# Patient Record
Sex: FEMALE | Race: BLACK OR AFRICAN AMERICAN | Employment: FULL TIME | URBAN - METROPOLITAN AREA
[De-identification: names, ages, dates, MRNs, and addresses within clinical notes are randomized per-mention and may not be internally consistent; named-entity substitution may affect disease eponyms.]

---

## 2017-01-05 ENCOUNTER — TELEPHONE (OUTPATIENT)
Dept: FAMILY MEDICINE CLINIC | Age: 52
End: 2017-01-05

## 2017-03-16 ENCOUNTER — HOSPITAL ENCOUNTER (OUTPATIENT)
Dept: LAB | Age: 52
Discharge: HOME | End: 2017-03-16

## 2017-03-16 ENCOUNTER — HOSPITAL ENCOUNTER (OUTPATIENT)
Dept: GENERAL RADIOLOGY | Age: 52
Discharge: HOME | End: 2017-03-16
Payer: COMMERCIAL

## 2017-03-16 ENCOUNTER — OFFICE VISIT (OUTPATIENT)
Dept: FAMILY MEDICINE CLINIC | Age: 52
End: 2017-03-16

## 2017-03-16 VITALS
HEART RATE: 80 BPM | OXYGEN SATURATION: 100 % | SYSTOLIC BLOOD PRESSURE: 135 MMHG | TEMPERATURE: 97.7 F | WEIGHT: 219 LBS | BODY MASS INDEX: 40.3 KG/M2 | DIASTOLIC BLOOD PRESSURE: 95 MMHG | RESPIRATION RATE: 18 BRPM | HEIGHT: 62 IN

## 2017-03-16 DIAGNOSIS — M79.672 LEFT FOOT PAIN: ICD-10-CM

## 2017-03-16 DIAGNOSIS — D75.839 THROMBOCYTOSIS: ICD-10-CM

## 2017-03-16 DIAGNOSIS — E55.9 VITAMIN D DEFICIENCY: ICD-10-CM

## 2017-03-16 DIAGNOSIS — E78.00 PURE HYPERCHOLESTEROLEMIA: ICD-10-CM

## 2017-03-16 DIAGNOSIS — I10 ESSENTIAL HYPERTENSION: ICD-10-CM

## 2017-03-16 DIAGNOSIS — M79.672 LEFT FOOT PAIN: Primary | ICD-10-CM

## 2017-03-16 PROCEDURE — 73620 X-RAY EXAM OF FOOT: CPT

## 2017-03-16 RX ORDER — METOPROLOL SUCCINATE 25 MG/1
25 TABLET, EXTENDED RELEASE ORAL DAILY
Qty: 30 TAB | Refills: 3 | Status: SHIPPED | OUTPATIENT
Start: 2017-03-16 | End: 2017-10-20 | Stop reason: SDUPTHER

## 2017-03-16 RX ORDER — MELATONIN
1000 DAILY
Qty: 90 TAB | Refills: 1 | Status: SHIPPED | OUTPATIENT
Start: 2017-03-16 | End: 2017-10-20

## 2017-03-16 NOTE — MR AVS SNAPSHOT
Visit Information Date & Time Provider Department Dept. Phone Encounter #  
 3/16/2017 10:45 AM Desiree Zaragoza, [address] [facility] [phone] [id number] Follow-up Instructions Return in about 1 month (around 4/16/2017) for Dr LACKEY Upcoming Health Maintenance Date Due DTaP/Tdap/Td series (1 - Tdap) 2/9/1985 FOBT Q 1 YEAR AGE 50-75 2/9/2014 INFLUENZA AGE 9 TO ADULT 8/1/2016 BREAST CANCER SCRN MAMMOGRAM 3/31/2018 PAP AKA CERVICAL CYTOLOGY 2/10/2019 Allergies as of 3/16/2017  Review Complete On: 3/16/2017 By: Desiree Zaragoza MD  
  
 Severity Noted Reaction Type Reactions Shellfish Derived  01/12/2016    Hives Current Immunizations  Reviewed on 1/12/2016 Name Date Influenza Vaccine (Quad) 1/12/2016 11:10 AM  
  
 Not reviewed this visit You Were Diagnosed With   
  
 Codes Comments Left foot pain    -  Primary ICD-10-CM: V74.140 ICD-9-CM: 729.5 Vitamin D deficiency     ICD-10-CM: E55.9 ICD-9-CM: 268.9 Pure hypercholesterolemia     ICD-10-CM: E78.00 ICD-9-CM: 272.0 Thrombocytosis ([facility] [address]     ICD-10-CM: D47.3 ICD-9-CM: 238.71 Essential hypertension     ICD-10-CM: I10 
ICD-9-CM: 401.9 Vitals BP Pulse Temp Resp Height(growth percentile) Weight(growth percentile) (!) 135/95 (BP 1 Location: Right arm, BP Patient Position: At rest) 80 97.7 °F (36.5 °C) (Oral) 18 5' 2\" (1.575 m) 219 lb (99.3 kg) SpO2 BMI OB Status Smoking Status 100% 40.06 kg/m2 Menopause Never Smoker Vitals History BMI and BSA Data Body Mass Index Body Surface Area 40.06 kg/m 2 2.08 m 2 Preferred Pharmacy Pharmacy Name Phone [facility] [address] [place] [address] Your Updated Medication List  
  
   
This list is accurate as of: 3/16/17 11:50 AM.  Always use your most recent med list. amLODIPine 10 mg tablet Commonly known as:  [place] Take 1 Tab by mouth daily. butalbital-acetaminophen-caffeine -40 mg per tablet Commonly known as:  [employer] Take 1 Tab by mouth every four (4) hours as needed for Pain. Max Daily Amount: 6 Tabs. * cholecalciferol 1,000 unit tablet Commonly known as:  VITAMIN D3 Take 2 Tabs by mouth daily. * cholecalciferol 1,000 unit tablet Commonly known as:  VITAMIN D3 Take 1 Tab by mouth daily. metoprolol succinate 25 mg XL tablet Commonly known as:  TOPROL-XL Take 1 Tab by mouth daily. * Notice: This list has 2 medication(s) that are the same as other medications prescribed for you. Read the directions carefully, and ask your doctor or other care provider to review them with you. Prescriptions Sent to Pharmacy Refills  
 cholecalciferol (VITAMIN D3) 1,000 unit tablet 1 Sig: Take 1 Tab by mouth daily. Class: Normal  
 Pharmacy: [address] [place] [address] Ph #: [phone] Route: Oral  
 metoprolol succinate (TOPROL-XL) 25 mg XL tablet 3 Sig: Take 1 Tab by mouth daily. Class: Normal  
 Pharmacy: [address] [place] [address] Ph #: [phone] Route: Oral  
  
We Performed the Following REFERRAL TO PODIATRY [REF90 Custom] Comments:  
 Please evaluate patient for  Left foot pain Follow-up Instructions Return in about 1 month (around 4/16/2017) for Dr LACKEY To-Do List   
 03/16/2017 Lab:  CBC WITH AUTOMATED DIFF   
  
 03/16/2017 Lab:  LIPID PANEL   
  
 03/16/2017 Lab:  SED RATE (ESR)   
  
 03/16/2017 Lab:  URIC ACID   
  
 03/16/2017 Lab:  VITAMIN D, 1, 25 DIHYDROXY   
  
 03/16/2017 Imaging:  XR FOOT LT AP/LAT   
  
 04/03/2017 8:30 AM  
  Appointment with [facility] at [place] [address] ([phone]) OUTSIDE FILMS  - Any outside films related to the study being scheduled should be brought with you on the day of the exam.  If this cannot be done there may be a delay in the reading of the study. MEDICATIONS  - Patient must bring a complete list of all medications currently taking to include prescriptions, over-the-counter meds, herbals, vitamins & any dietary supplements  GENERAL INSTRUCTIONS  - On the day of your exam do not use any bath powder, deodorant or lotions on the armpit area. -Tenderness of breasts may cause an increase of discomfort during procedure. If you are experiencing breast tenderness on the day of your appointment and would like to reschedule, please call [phone]. Referral Information Referral ID Referred By Referred To  
  
 [id number] [facility], [address] [place] [address] Not Available Visits Status Start Date End Date 1 New Request 3/16/17 3/16/18 If your referral has a status of pending review or denied, additional information will be sent to support the outcome of this decision. Introducing [facility] [place] [facility]! [employer] introduces [employer] patient portal. Now you can access parts of your medical record, email your doctor's office, and request medication refills online. 1. In your internet browser, go to [email or web address] 2. Click on the First Time User? Click Here link in the Sign In box. You will see the New Member Sign Up page. 3. Enter your [employer]elens Access Code exactly as it appears below. You will not need to use this code after youve completed the sign-up process. If you do not sign up before the expiration date, you must request a new code. · [employer] Access Code: [email or web address] Expires: 6/14/2017 11:50 AM 
 
4. Enter the last four digits of your Social Security Number (xxxx) and Date of Birth (mm/dd/yyyy) as indicated and click Submit. You will be taken to the next sign-up page. 5. Create a Ulule ID. This will be your Ulule login ID and cannot be changed, so think of one that is secure and easy to remember. 6. Create a Ulule password. You can change your password at any time. 7. Enter your Password Reset Question and Answer. This can be used at a later time if you forget your password. 8. Enter your e-mail address. You will receive e-mail notification when new information is available in [address] 9. Click Sign Up. You can now view and download portions of your medical record. 10. Click the Download Summary menu link to download a portable copy of your medical information. If you have questions, please visit the Frequently Asked Questions section of the Ulule website. Remember, Ulule is NOT to be used for urgent needs. For medical emergencies, dial 911. Now available from your iPhone and Android! Please provide this summary of care documentation to your next provider. Your primary care clinician is listed as Rhonda Johnson. If you have any questions after today's visit, please call [phone].

## 2017-03-16 NOTE — PROGRESS NOTES
Belen Macias is a 47 y.o.  female and presents with     Chief Complaint   Patient presents with    Foot Problem     both    Hypertension    Cholesterol Problem       Pt has pain to the medial aspect of left foot. Pt gives h/o injry and fracture of her foot in the remote past.  Pt is taking BP meds as directed . She is following diet for high chol. Pt takes vitamin D but ran out. Pt does get occasional headaches. Past Medical History:   Diagnosis Date    Sickle cell trait ([address]      Past Surgical History:   Procedure Laterality Date    HX TUBAL LIGATION       Current Outpatient Prescriptions   Medication Sig    cholecalciferol (VITAMIN D3) 1,000 unit tablet Take 1 Tab by mouth daily.  metoprolol succinate (TOPROL-XL) 25 mg XL tablet Take 1 Tab by mouth daily.  amLODIPine (NORVASC) 10 mg tablet Take 1 Tab by mouth daily.  butalbital-acetaminophen-caffeine (FIORICET) -40 mg per tablet Take 1 Tab by mouth every four (4) hours as needed for Pain. Max Daily Amount: 6 Tabs.  cholecalciferol (VITAMIN D3) 1,000 unit tablet Take 2 Tabs by mouth daily. No current facility-administered medications for this visit. Health Maintenance   Topic Date Due    DTaP/Tdap/Td series (1 - Tdap) 02/09/1985    FOBT Q 1 YEAR AGE 50-75  02/09/2014    INFLUENZA AGE 9 TO ADULT  08/01/2016    BREAST CANCER SCRN MAMMOGRAM  03/31/2018    PAP AKA CERVICAL CYTOLOGY  02/10/2019    Hepatitis C Screening  Completed     Immunization History   Administered Date(s) Administered    Influenza Vaccine (Quad) 01/12/2016     No LMP recorded. Patient is not currently having periods (Reason: Menopause). Allergies and Intolerances:    Allergies   Allergen Reactions    Shellfish Derived Hives       Family History:   Family History   Problem Relation Age of Onset    Heart Disease Father 61    Breast Cancer Other     Diabetes Mother     Hypertension Mother     Hypertension Sister     Diabetes Sister  Cancer Paternal Grandfather        Social History:   She  reports that she has never smoked. She has never used smokeless tobacco.  She  reports that she drinks alcohol. Review of Systems:   General: negative for - chills, fatigue, fever, weight change  Psych: negative for - anxiety, depression, irritability or mood swings  ENT: negative for - headaches, hearing change, nasal congestion, oral lesions, sneezing or sore throat  Heme/ Lymph: negative for - bleeding problems, bruising, pallor or swollen lymph nodes  Endo: negative for - hot flashes, polydipsia/polyuria or temperature intolerance  Resp: negative for - cough, shortness of breath or wheezing  CV: negative for - chest pain, edema or palpitations  GI: negative for - abdominal pain, change in bowel habits, constipation, diarrhea or nausea/vomiting  : negative for - dysuria, hematuria, incontinence, pelvic pain or vulvar/vaginal symptoms  MSK: negative for - joint pain, joint swelling or muscle pain, pos for left foot pain  Neuro: negative for - confusion, pos for  headaches,  Derm: negative for - dry skin, hair changes, rash or skin lesion changes          Physical:   Vitals:   Vitals:    03/16/17 1120   BP: (!) 135/95   Pulse: 80   Resp: 18   Temp: 97.7 °F (36.5 °C)   TempSrc: Oral   SpO2: 100%   Weight: 219 lb (99.3 kg)   Height: 5' 2\" (1.575 m)           Exam:   HEENT- atraumatic,normocephalic, awake, oriented, well nourished  Neck - supple,no enlarged lymph nodes, no JVD, no thyromegaly  Chest- CTA, no rhonchi, no crackles  Heart- rrr, no murmurs / gallop/rub  Abdomen- soft,BS+,NT, no hepatosplenomegaly  Ext - no c/c/edema , left foot mild bony prominence over medial aspect  Neuro- no focal deficits. Power 5/5 all extremities  Skin - warm,dry, no obvious rashes.           Review of Data:   LABS:   Lab Results   Component Value Date/Time    WBC 3.6 01/12/2016 11:14 AM    HGB 12.8 01/12/2016 11:14 AM    HCT 41.3 01/12/2016 11:14 AM PLATELET 039 81/62/0080 11:14 AM     Lab Results   Component Value Date/Time    Sodium 140 01/12/2016 11:14 AM    Potassium 4.8 01/12/2016 11:14 AM    Chloride 99 01/12/2016 11:14 AM    CO2 27 01/12/2016 11:14 AM    Glucose 81 01/12/2016 11:14 AM    BUN 11 01/12/2016 11:14 AM    Creatinine 0.72 01/12/2016 11:14 AM     Lab Results   Component Value Date/Time    Cholesterol, total 214 01/12/2016 11:14 AM    HDL Cholesterol 63 01/12/2016 11:14 AM    LDL, calculated 138 01/12/2016 11:14 AM    Triglyceride 67 01/12/2016 11:14 AM     No results found for: GPT        Impression / Plan:        ICD-10-CM ICD-9-CM    1. Left foot pain M79.672 729.5 URIC ACID      XR FOOT LT AP/LAT      REFERRAL TO PODIATRY   2. Vitamin D deficiency E55.9 268.9 cholecalciferol (VITAMIN D3) 1,000 unit tablet      VITAMIN D, 1, 25 DIHYDROXY   3. Pure hypercholesterolemia E78.00 272.0 LIPID PANEL   4. Thrombocytosis (HCC) D47.3 238.71 CBC WITH AUTOMATED DIFF      SED RATE (ESR)   5. Essential hypertension I10 401.9 metoprolol succinate (TOPROL-XL) 25 mg XL tablet     Low salt diet        Explained to patient risk benefits of the medications. Advised patient to stop meds if having any side effects. Pt verbalized understanding of the instructions. I have discussed the diagnosis with the patient and the intended plan as seen in the above orders. The patient has received an after-visit summary and questions were answered concerning future plans. I have discussed medication side effects and warnings with the patient as well. I have reviewed the plan of care with the patient, accepted their input and they are in agreement with the treatment goals. Reviewed plan of care. Patient has provided input and agrees with goals.     Follow-up Disposition: Not on Rito Quiroz MD

## 2017-03-17 LAB
1,25(OH)2D3 SERPL-MCNC: 38.4 PG/ML (ref 19.9–79.3)
BASOPHILS # BLD AUTO: 0 X10E3/UL (ref 0–0.2)
BASOPHILS NFR BLD AUTO: 1 %
CHOLEST SERPL-MCNC: 184 MG/DL (ref 100–199)
EOSINOPHIL # BLD AUTO: 0.1 X10E3/UL (ref 0–0.4)
EOSINOPHIL NFR BLD AUTO: 3 %
ERYTHROCYTE [DISTWIDTH] IN BLOOD BY AUTOMATED COUNT: 13.9 % (ref 12.3–15.4)
ERYTHROCYTE [SEDIMENTATION RATE] IN BLOOD BY WESTERGREN METHOD: 10 MM/HR (ref 0–40)
HCT VFR BLD AUTO: 38.8 % (ref 34–46.6)
HDLC SERPL-MCNC: 51 MG/DL
HGB BLD-MCNC: 12.8 G/DL (ref 11.1–15.9)
IMM GRANULOCYTES # BLD: 0 X10E3/UL (ref 0–0.1)
IMM GRANULOCYTES NFR BLD: 0 %
INTERPRETATION, 910389: NORMAL
LDLC SERPL CALC-MCNC: 121 MG/DL (ref 0–99)
LYMPHOCYTES # BLD AUTO: 1.6 X10E3/UL (ref 0.7–3.1)
LYMPHOCYTES NFR BLD AUTO: 52 %
MCH RBC QN AUTO: 28.4 PG (ref 26.6–33)
MCHC RBC AUTO-ENTMCNC: 33 G/DL (ref 31.5–35.7)
MCV RBC AUTO: 86 FL (ref 79–97)
MONOCYTES # BLD AUTO: 0.2 X10E3/UL (ref 0.1–0.9)
MONOCYTES NFR BLD AUTO: 7 %
NEUTROPHILS # BLD AUTO: 1.1 X10E3/UL (ref 1.4–7)
NEUTROPHILS NFR BLD AUTO: 37 %
NRBC BLD AUTO-RTO: 0 %
PLATELET # BLD AUTO: 374 X10E3/UL (ref 150–379)
RBC # BLD AUTO: 4.51 X10E6/UL (ref 3.77–5.28)
TRIGL SERPL-MCNC: 62 MG/DL (ref 0–149)
URATE SERPL-MCNC: 4.3 MG/DL (ref 2.5–7.1)
VLDLC SERPL CALC-MCNC: 12 MG/DL (ref 5–40)
WBC # BLD AUTO: 3.1 X10E3/UL (ref 3.4–10.8)

## 2017-10-20 ENCOUNTER — OFFICE VISIT (OUTPATIENT)
Dept: FAMILY MEDICINE CLINIC | Age: 52
End: 2017-10-20

## 2017-10-20 ENCOUNTER — HOSPITAL ENCOUNTER (OUTPATIENT)
Dept: LAB | Age: 52
Discharge: HOME | End: 2017-10-20
Payer: COMMERCIAL

## 2017-10-20 ENCOUNTER — HOSPITAL ENCOUNTER (OUTPATIENT)
Dept: GENERAL RADIOLOGY | Age: 52
Discharge: HOME | End: 2017-10-20
Payer: COMMERCIAL

## 2017-10-20 VITALS
DIASTOLIC BLOOD PRESSURE: 81 MMHG | WEIGHT: 214 LBS | HEART RATE: 82 BPM | TEMPERATURE: 97.3 F | HEIGHT: 62 IN | OXYGEN SATURATION: 99 % | RESPIRATION RATE: 20 BRPM | BODY MASS INDEX: 39.38 KG/M2 | SYSTOLIC BLOOD PRESSURE: 139 MMHG

## 2017-10-20 DIAGNOSIS — G89.29 CHRONIC PAIN OF LEFT ANKLE: ICD-10-CM

## 2017-10-20 DIAGNOSIS — Z12.11 SCREENING FOR COLON CANCER: ICD-10-CM

## 2017-10-20 DIAGNOSIS — I10 ESSENTIAL HYPERTENSION: Chronic | ICD-10-CM

## 2017-10-20 DIAGNOSIS — M25.572 CHRONIC PAIN OF LEFT ANKLE: ICD-10-CM

## 2017-10-20 DIAGNOSIS — M25.552 PAIN OF LEFT HIP JOINT: ICD-10-CM

## 2017-10-20 DIAGNOSIS — Z12.39 SCREENING FOR BREAST CANCER: ICD-10-CM

## 2017-10-20 DIAGNOSIS — Z00.00 ROUTINE GENERAL MEDICAL EXAMINATION AT A HEALTH CARE FACILITY: Primary | ICD-10-CM

## 2017-10-20 LAB
ALBUMIN SERPL-MCNC: 3.5 G/DL (ref 3.4–5)
ALBUMIN/GLOB SERPL: 0.9 {RATIO} (ref 0.8–1.7)
ALP SERPL-CCNC: 100 U/L (ref 45–117)
ALT SERPL-CCNC: 17 U/L (ref 13–56)
ANION GAP SERPL CALC-SCNC: 8 MMOL/L (ref 3–18)
APPEARANCE UR: CLEAR
AST SERPL-CCNC: 15 U/L (ref 15–37)
BASOPHILS # BLD: 0 K/UL (ref 0–0.06)
BASOPHILS NFR BLD: 1 % (ref 0–2)
BILIRUB SERPL-MCNC: 0.7 MG/DL (ref 0.2–1)
BILIRUB UR QL: NEGATIVE
BUN SERPL-MCNC: 11 MG/DL (ref 7–18)
BUN/CREAT SERPL: 18 (ref 12–20)
CALCIUM SERPL-MCNC: 8.2 MG/DL (ref 8.5–10.1)
CHLORIDE SERPL-SCNC: 104 MMOL/L (ref 100–108)
CHOLEST SERPL-MCNC: 192 MG/DL
CO2 SERPL-SCNC: 29 MMOL/L (ref 21–32)
COLOR UR: YELLOW
CREAT SERPL-MCNC: 0.61 MG/DL (ref 0.6–1.3)
DIFFERENTIAL METHOD BLD: ABNORMAL
EOSINOPHIL # BLD: 0.1 K/UL (ref 0–0.4)
EOSINOPHIL NFR BLD: 2 % (ref 0–5)
ERYTHROCYTE [DISTWIDTH] IN BLOOD BY AUTOMATED COUNT: 12.9 % (ref 11.6–14.5)
EST. AVERAGE GLUCOSE BLD GHB EST-MCNC: 103 MG/DL
GLOBULIN SER CALC-MCNC: 3.8 G/DL (ref 2–4)
GLUCOSE SERPL-MCNC: 76 MG/DL (ref 74–99)
GLUCOSE UR STRIP.AUTO-MCNC: NEGATIVE MG/DL
HBA1C MFR BLD: 5.2 % (ref 4.2–5.6)
HCT VFR BLD AUTO: 36.6 % (ref 35–45)
HDLC SERPL-MCNC: 58 MG/DL (ref 40–60)
HDLC SERPL: 3.3 {RATIO} (ref 0–5)
HGB BLD-MCNC: 11.9 G/DL (ref 12–16)
HGB UR QL STRIP: NEGATIVE
KETONES UR QL STRIP.AUTO: NEGATIVE MG/DL
LDLC SERPL CALC-MCNC: 120.6 MG/DL (ref 0–100)
LEUKOCYTE ESTERASE UR QL STRIP.AUTO: NEGATIVE
LIPID PROFILE,FLP: ABNORMAL
LYMPHOCYTES # BLD: 2.6 K/UL (ref 0.9–3.6)
LYMPHOCYTES NFR BLD: 60 % (ref 21–52)
MCH RBC QN AUTO: 28.1 PG (ref 24–34)
MCHC RBC AUTO-ENTMCNC: 32.5 G/DL (ref 31–37)
MCV RBC AUTO: 86.5 FL (ref 74–97)
MONOCYTES # BLD: 0.3 K/UL (ref 0.05–1.2)
MONOCYTES NFR BLD: 7 % (ref 3–10)
NEUTS SEG # BLD: 1.3 K/UL (ref 1.8–8)
NEUTS SEG NFR BLD: 30 % (ref 40–73)
NITRITE UR QL STRIP.AUTO: NEGATIVE
PH UR STRIP: 5 [PH] (ref 5–8)
PLATELET # BLD AUTO: 431 K/UL (ref 135–420)
PMV BLD AUTO: 9.3 FL (ref 9.2–11.8)
POTASSIUM SERPL-SCNC: 4.3 MMOL/L (ref 3.5–5.5)
PROT SERPL-MCNC: 7.3 G/DL (ref 6.4–8.2)
PROT UR STRIP-MCNC: NEGATIVE MG/DL
RBC # BLD AUTO: 4.23 M/UL (ref 4.2–5.3)
SODIUM SERPL-SCNC: 141 MMOL/L (ref 136–145)
SP GR UR REFRACTOMETRY: 1.03 (ref 1–1.03)
TRIGL SERPL-MCNC: 67 MG/DL (ref ?–150)
UROBILINOGEN UR QL STRIP.AUTO: 0.2 EU/DL (ref 0.2–1)
VLDLC SERPL CALC-MCNC: 13.4 MG/DL
WBC # BLD AUTO: 4.2 K/UL (ref 4.6–13.2)

## 2017-10-20 PROCEDURE — 80053 COMPREHEN METABOLIC PANEL: CPT | Performed by: INTERNAL MEDICINE

## 2017-10-20 PROCEDURE — 73502 X-RAY EXAM HIP UNI 2-3 VIEWS: CPT

## 2017-10-20 PROCEDURE — 80061 LIPID PANEL: CPT | Performed by: INTERNAL MEDICINE

## 2017-10-20 PROCEDURE — 73610 X-RAY EXAM OF ANKLE: CPT

## 2017-10-20 PROCEDURE — 83036 HEMOGLOBIN GLYCOSYLATED A1C: CPT | Performed by: INTERNAL MEDICINE

## 2017-10-20 PROCEDURE — 36415 COLL VENOUS BLD VENIPUNCTURE: CPT | Performed by: INTERNAL MEDICINE

## 2017-10-20 PROCEDURE — 85025 COMPLETE CBC W/AUTO DIFF WBC: CPT | Performed by: INTERNAL MEDICINE

## 2017-10-20 PROCEDURE — 81003 URINALYSIS AUTO W/O SCOPE: CPT | Performed by: INTERNAL MEDICINE

## 2017-10-20 RX ORDER — IBUPROFEN 800 MG/1
800 TABLET ORAL
Qty: 100 TAB | Refills: 0 | Status: SHIPPED | OUTPATIENT
Start: 2017-10-20 | End: 2018-03-21 | Stop reason: SDUPTHER

## 2017-10-20 RX ORDER — METOPROLOL SUCCINATE 25 MG/1
25 TABLET, EXTENDED RELEASE ORAL DAILY
Qty: 90 TAB | Refills: 1 | Status: SHIPPED | OUTPATIENT
Start: 2017-10-20 | End: 2018-03-07 | Stop reason: SDUPTHER

## 2017-10-20 NOTE — PROGRESS NOTES
1. Have you been to the ER, urgent care clinic since your last visit? Hospitalized since your last visit? No    2. Have you seen or consulted any other health care providers outside of the [address] [place] [address] since your last visit? Include any pap smears or colon screening.  No

## 2017-10-20 NOTE — PROGRESS NOTES
ANNUAL PHYSICAL EXAMINATION    History of Present Illness  Yifan Browning is a 47 y.o. female who presents today for management of    Chief Complaint   Patient presents with    Physical    Hypertension       Patient complains of chronic left ankle pain. Pain occurs randomly even while at rest. Pain intensity 10/10. It is associated with on and off swelling. She has history of left ankle fracture 5 years ago. She also reports of left hip pain few months. Pain is intermittent, worse when walking. Pain is sharp, non-radiating. No history of trauma or injury. She takes Motrin 800mg and Tylenol as needed with partial relief. Health Maintenance  Colon cancer: due   Dyslipidemia: due  Diabetes mellitus: due  Influenza vaccine: done  Pneumococcal vaccine: not indicated  Tdap: done  Herpes Zoster vaccine: due at age 60  Hep B vaccine: not indicated    Weight:  Body mass index is Estimated body mass index is Body mass index is 39.14 kg/(m^2). Karo Briggs Discussed the patient's BMI with her.  The BMI follow up plan is as follows: Improve diet and 30 min of moderate activity at least 5 times a week. Cervical cancer:  Pap smear uptodate  Breast Cancer: Mammogram due   Diet: no  Exercise: no  Seatbelt: yes  Sunscreen: no  Dentist: yes      Past Medical History  Past Medical History:   Diagnosis Date    Sickle cell trait ([facility] [address]         Surgical History  Past Surgical History:   Procedure Laterality Date    HX TUBAL LIGATION          Current Medications  Current Outpatient Prescriptions   Medication Sig    metoprolol succinate (TOPROL-XL) 25 mg XL tablet Take 1 Tab by mouth daily.  ibuprofen (MOTRIN) 800 mg tablet Take 1 Tab by mouth every eight (8) hours as needed for Pain.  butalbital-acetaminophen-caffeine (FIORICET) -40 mg per tablet Take 1 Tab by mouth every four (4) hours as needed for Pain. Max Daily Amount: 6 Tabs.  cholecalciferol (VITAMIN D3) 1,000 unit tablet Take 2 Tabs by mouth daily.      No current facility-administered medications for this visit.         Allergies/Drug Reactions  Allergies   Allergen Reactions    Shellfish Derived Hives        Family History  Family History   Problem Relation Age of Onset    Heart Disease Father 61    Breast Cancer Other     Diabetes Mother     Hypertension Mother     Hypertension Sister     Diabetes Sister     Cancer Paternal Grandfather         Social History  Social History     Social History    Marital status: SINGLE     Spouse name: N/A    Number of children: N/A    Years of education: N/A     Occupational History          Stefan Radha Cheryl     Social History Main Topics    Smoking status: Never Smoker    Smokeless tobacco: Never Used    Alcohol use 0.0 oz/week     0 Standard drinks or equivalent per week      Comment: one glass of wine occasionally    Drug use: No    Sexual activity: No     Other Topics Concern    Not on file     Social History Narrative       Health Maintenance   Topic Date Due    DTaP/Tdap/Td series (1 - Tdap) 02/09/1985    FOBT Q 1 YEAR AGE 50-75  02/09/2014    BREAST CANCER SCRN MAMMOGRAM  03/31/2018    PAP AKA CERVICAL CYTOLOGY  02/10/2019    Hepatitis C Screening  Completed    INFLUENZA AGE 9 TO ADULT  Completed     Immunization History   Administered Date(s) Administered    Influenza Vaccine (Quad) 01/12/2016       Review of Systems  General ROS: negative for - chills  Psychological ROS: negative  Ophthalmic ROS: positive for - uses glasses  ENT ROS: negative  Allergy and Immunology ROS: negative  Hematological and Lymphatic ROS: negative  Endocrine ROS: negative  Breast ROS: negative for breast lumps  Respiratory ROS: no cough, shortness of breath, or wheezing  Cardiovascular ROS: no chest pain or dyspnea on exertion  Gastrointestinal ROS: no abdominal pain, change in bowel habits, or black or bloody stools  Genito-Urinary ROS: no dysuria, trouble voiding, or hematuria  Musculoskeletal ROS: positive for - pain in ankle - left and hip - left  Neurological ROS: negative  Dermatological ROS: negative      No exam data present      Physical Exam  Vital signs:   Vitals:    10/20/17 1338   BP: 139/81   Pulse: 82   Resp: 20   Temp: 97.3 °F (36.3 °C)   TempSrc: Oral   SpO2: 99%   Weight: 214 lb (97.1 kg)   Height: 5' 2\" (1.575 m)       General: alert, oriented, not in distress  Head: scalp normal, atraumatic  Eyes: pupils are equal and reactive, full and intact EOM's  Ears: patent ear canal, intact tympanic membrane  Nose: normal turbinates, no congestion or discharge  Lips/Mouth: moist lips and buccal mucosa, non-enlarged tonsils, pink throat  Neck: supple, no JVD, no lymphadenopathy, non-palpable thyroid  Chest/Lungs: clear breath sounds, no wheezing or crackles  Heart: normal rate, regular rhythm, no murmur  Abdomen: soft, non-distended, non-tender, normal bowel sounds, no organomegaly, no masses  Extremities: no focal deformities, no edema  Skin: no active skin lesions    Assessment/Plan:      1. Routine general medical examination at a health care facility    2. Essential hypertension  - CBC WITH AUTOMATED DIFF; Future  - HEMOGLOBIN A1C WITH EAG; Future  - LIPID PANEL; Future  - METABOLIC PANEL, COMPREHENSIVE; Future  - URINALYSIS W/ RFLX MICROSCOPIC; Future  - metoprolol succinate (TOPROL-XL) 25 mg XL tablet; Take 1 Tab by mouth daily. Dispense: 90 Tab; Refill: 1    3. Screening for colon cancer  [facility]    4. Screening for breast cancer  - [facility] MAMM BI SCREENING INCL CAD; Future    5. Pain of left hip joint  - XR HIP LT W OR WO PELV 2-3 VWS; Future  - ibuprofen (MOTRIN) 800 mg tablet; Take 1 Tab by mouth every eight (8) hours as needed for Pain. Dispense: 100 Tab; Refill: 0    6. Chronic pain of left ankle  - XR ANKLE LT MIN 3 V; Future  - ibuprofen (MOTRIN) 800 mg tablet; Take 1 Tab by mouth every eight (8) hours as needed for Pain. Dispense: 100 Tab;  Refill: 0      Follow-up Disposition:  Return in about 6 months (around 4/20/2018), or as needed, for rov. I have discussed the diagnosis with the patient and the intended plan as seen in the above orders. The patient has received an after-visit summary and questions were answered concerning future plans. I have discussed medication side effects and warnings with the patient as well. I have reviewed the plan of care with the patient, accepted their input and they are in agreement with the treatment goals.        Marleen Weinberg MD  October 20, 2017

## 2017-10-23 ENCOUNTER — TELEPHONE (OUTPATIENT)
Dept: FAMILY MEDICINE CLINIC | Age: 52
End: 2017-10-23

## 2017-10-23 NOTE — PROGRESS NOTES
Please inform patient that knee and hip xrays are normal. Hip pain likely from bursitis or muscular in origin. Continue NSAID. Activity as tolerated.

## 2018-02-02 ENCOUNTER — TELEPHONE (OUTPATIENT)
Dept: SURGERY | Age: 53
End: 2018-02-02

## 2018-03-07 DIAGNOSIS — I10 ESSENTIAL HYPERTENSION: Chronic | ICD-10-CM

## 2018-03-07 RX ORDER — METOPROLOL SUCCINATE 25 MG/1
25 TABLET, EXTENDED RELEASE ORAL DAILY
Qty: 90 TAB | Refills: 1 | Status: SHIPPED | OUTPATIENT
Start: 2018-03-07 | End: 2018-03-21 | Stop reason: SDUPTHER

## 2018-03-21 ENCOUNTER — TELEPHONE (OUTPATIENT)
Dept: FAMILY MEDICINE CLINIC | Age: 53
End: 2018-03-21

## 2018-03-21 ENCOUNTER — OFFICE VISIT (OUTPATIENT)
Dept: FAMILY MEDICINE CLINIC | Age: 53
End: 2018-03-21

## 2018-03-21 VITALS
HEART RATE: 73 BPM | WEIGHT: 218 LBS | OXYGEN SATURATION: 98 % | HEIGHT: 62 IN | SYSTOLIC BLOOD PRESSURE: 140 MMHG | BODY MASS INDEX: 40.12 KG/M2 | DIASTOLIC BLOOD PRESSURE: 90 MMHG | TEMPERATURE: 97.2 F | RESPIRATION RATE: 20 BRPM

## 2018-03-21 DIAGNOSIS — Z12.39 SCREENING FOR MALIGNANT NEOPLASM OF BREAST: Primary | ICD-10-CM

## 2018-03-21 DIAGNOSIS — G44.229 CHRONIC TENSION-TYPE HEADACHE, NOT INTRACTABLE: ICD-10-CM

## 2018-03-21 DIAGNOSIS — I10 ESSENTIAL HYPERTENSION: Primary | Chronic | ICD-10-CM

## 2018-03-21 DIAGNOSIS — I10 ESSENTIAL HYPERTENSION: Chronic | ICD-10-CM

## 2018-03-21 PROBLEM — E66.01 SEVERE OBESITY (BMI 35.0-39.9) WITH COMORBIDITY (HCC): Status: ACTIVE | Noted: 2018-03-21

## 2018-03-21 RX ORDER — METOPROLOL SUCCINATE 25 MG/1
25 TABLET, EXTENDED RELEASE ORAL DAILY
Qty: 90 TAB | Refills: 1 | Status: SHIPPED | OUTPATIENT
Start: 2018-03-21 | End: 2018-03-22 | Stop reason: CLARIF

## 2018-03-21 RX ORDER — IBUPROFEN 800 MG/1
800 TABLET ORAL
Qty: 100 TAB | Refills: 0 | Status: SHIPPED | OUTPATIENT
Start: 2018-03-21 | End: 2019-05-08 | Stop reason: SDUPTHER

## 2018-03-21 NOTE — MR AVS SNAPSHOT
[address] [place] [id number] [place] [id number] 
[phone] Patient: Yesenia Echavarria MRN: [id number] :1965 Visit Information Date & Time Provider Department Dept. Phone Encounter #  
 3/21/2018  9:30 AM Jose Mora MD [address] [place] [address]  [id number] Follow-up Instructions Return in about 6 months (around 2018) for rov. Upcoming Health Maintenance Date Due DTaP/Tdap/Td series (1 - Tdap) 1985 FOBT Q 1 YEAR AGE 50-75 2014 BREAST CANCER SCRN MAMMOGRAM 3/31/2018 PAP AKA CERVICAL CYTOLOGY 2/10/2019 Allergies as of 3/21/2018  Review Complete On: 3/21/2018 By: Jose Mora MD  
  
 Severity Noted Reaction Type Reactions Shellfish Derived  2016    Hives Current Immunizations  Reviewed on 2016 Name Date Influenza Vaccine (Quad) 2016 11:10 AM  
  
 Not reviewed this visit You Were Diagnosed With   
  
 Codes Comments Screening for malignant neoplasm of breast    -  Primary ICD-10-CM: Z12.31 
ICD-9-CM: V76.10 Essential hypertension     ICD-10-CM: I10 
ICD-9-CM: 401.9 Chronic tension-type headache, not intractable     ICD-10-CM: P73.237 ICD-9-CM: 339.12 Vitals BP Pulse Temp Resp Height(growth percentile) Weight(growth percentile) 140/90 73 97.2 °F (36.2 °C) (Oral) 20 5' 2\" (1.575 m) 218 lb (98.9 kg) SpO2 BMI OB Status Smoking Status 98% 39.87 kg/m2 Menopause Never Smoker Vitals History BMI and BSA Data Body Mass Index Body Surface Area  
 39.87 kg/m 2 2.08 m 2 Preferred Pharmacy Pharmacy Name Phone [facility] [phone] Your Updated Medication List  
  
   
This list is accurate as of 3/21/18 10:03 AM.  Always use your most recent med list.  
  
  
  
  
 cholecalciferol 1,000 unit tablet Commonly known as:  VITAMIN D3 Take 2 Tabs by mouth daily. ibuprofen 800 mg tablet Commonly known as:  MOTRIN Take 1 Tab by mouth every eight (8) hours as needed for Pain.  
  
 metoprolol succinate 25 mg XL tablet Commonly known as:  TOPROL-XL Take 1 Tab by mouth daily. Prescriptions Sent to Pharmacy Refills  
 metoprolol succinate (TOPROL-XL) 25 mg XL tablet 1 Sig: Take 1 Tab by mouth daily. Class: Normal  
 Pharmacy: [address] #: [phone] Route: Oral  
 ibuprofen (MOTRIN) 800 mg tablet 0 Sig: Take 1 Tab by mouth every eight (8) hours as needed for Pain. Class: Normal  
 Pharmacy: [address] #: [phone] Route: Oral  
  
Follow-up Instructions Return in about 6 months (around 9/21/2018) for rov. To-Do List   
 03/21/2018 Imaging:  [facility] MAMMO BI SCREENING INCL CAD Patient Instructions Domonique Preston MD 
[address] [place] [address] Phone: [phone] Fax: [phone] 
  
 
 
  
Introducing [facility] [place] [facility]! [facility] introduces [employer] patient portal. Now you can access parts of your medical record, email your doctor's office, and request medication refills online. 1. In your internet browser, go to [email or web address] 2. Click on the First Time User? Click Here link in the Sign In box. You will see the New Member Sign Up page. 3. Enter your [employer] Access Code exactly as it appears below. You will not need to use this code after youve completed the sign-up process. If you do not sign up before the expiration date, you must request a new code. · [employer] Access Code: [email or web address] Expires: 6/19/2018 10:03 AM 
 
4.  Enter the last four digits of your Social Security Number (xxxx) and Date of Birth (mm/dd/yyyy) as indicated and click Submit. You will be taken to the next sign-up page. 5. Create a DXY ID. This will be your DXY login ID and cannot be changed, so think of one that is secure and easy to remember. 6. Create a DXY password. You can change your password at any time. 7. Enter your Password Reset Question and Answer. This can be used at a later time if you forget your password. 8. Enter your e-mail address. You will receive e-mail notification when new information is available in [address] 9. Click Sign Up. You can now view and download portions of your medical record. 10. Click the Download Summary menu link to download a portable copy of your medical information. If you have questions, please visit the Frequently Asked Questions section of the DXY website. Remember, DXY is NOT to be used for urgent needs. For medical emergencies, dial 911. Now available from your iPhone and Android! Please provide this summary of care documentation to your next provider. Your primary care clinician is listed as Tatyana Howell. If you have any questions after today's visit, please call [phone].

## 2018-03-21 NOTE — PROGRESS NOTES
1. Have you been to the ER, urgent care clinic since your last visit? Hospitalized since your last visit? No    2. Have you seen or consulted any other health care providers outside of the [address] [place] [address] since your last visit? Include any pap smears or colon screening.  No

## 2018-03-21 NOTE — TELEPHONE ENCOUNTER
Pt stated that Dr. Sophie Lopes was suppose to prescribe her amlodipine instead of Metoprolol. Pt was just seen toay 3/21/18 and would for her medication to be switched. Asking to be called back.

## 2018-03-21 NOTE — PROGRESS NOTES
History of Present Illness  Enrique Beatty is a 46 y.o. female who presents today for management of    Chief Complaint   Patient presents with    Hypertension       Hypertension  Patient is here for follow-up of hypertension. She indicates that she is feeling well and denies any symptoms referable to her hypertension. She is not exercising and is not adherent to low salt diet. Blood pressure is not not measured at home. Use of agents associated with hypertension: none. Problem List  Patient Active Problem List    Diagnosis Date Noted    Severe obesity (BMI 35.0-39. 9) with comorbidity ([facility] 03/21/2018    Acute non intractable tension-type headache 09/23/2016    Vitamin D deficiency 01/29/2016    Hyperlipidemia 01/29/2016    Obesity 01/12/2016    Essential hypertension 01/12/2016    Joint stiffness 01/12/2016       Past Medical History  Past Medical History:   Diagnosis Date    Sickle cell trait ([facility]         Surgical History  Past Surgical History:   Procedure Laterality Date    HX TUBAL LIGATION          Current Medications  Current Outpatient Prescriptions   Medication Sig    ibuprofen (MOTRIN) 800 mg tablet Take 1 Tab by mouth every eight (8) hours as needed for Pain.  amLODIPine (NORVASC) 10 mg tablet Take 1 Tab by mouth daily.  cholecalciferol (VITAMIN D3) 1,000 unit tablet Take 2 Tabs by mouth daily. No current facility-administered medications for this visit.         Allergies/Drug Reactions  Allergies   Allergen Reactions    Shellfish Derived Hives        Family History  Family History   Problem Relation Age of Onset    Heart Disease Father 61    Breast Cancer Other     Diabetes Mother     Hypertension Mother     Hypertension Sister     Diabetes Sister     Cancer Paternal Grandfather         Social History  Social History     Social History    Marital status: SINGLE     Spouse name: N/A    Number of children: N/A    Years of education: N/A     Occupational History          [employer]     Social History Main Topics    Smoking status: Never Smoker    Smokeless tobacco: Never Used    Alcohol use 0.0 oz/week     0 Standard drinks or equivalent per week      Comment: one glass of wine occasionally    Drug use: No    Sexual activity: No     Other Topics Concern    Not on file     Social History Narrative       Review of Systems  General ROS: negative for - chills, fatigue or fever  Respiratory ROS: no cough, shortness of breath, or wheezing  Cardiovascular ROS: no chest pain or dyspnea on exertion  Gastrointestinal ROS: no abdominal pain, change in bowel habits, or black or bloody stools  Genito-Urinary ROS: no dysuria, trouble voiding, or hematuria  Musculoskeletal ROS: negative  Neurological ROS: negative      Physical Exam  Vital signs:   Vitals:    03/21/18 0938 03/21/18 0944   BP: (!) 143/105 140/90   Pulse: 73    Resp: 20    Temp: 97.2 °F (36.2 °C)    TempSrc: Oral    SpO2: 98%    Weight: 218 lb (98.9 kg)    Height: 5' 2\" (1.575 m)        General: alert, oriented, not in distress  Chest/Lungs: clear breath sounds, no wheezing or crackles  Heart: normal rate, regular rhythm, no murmur  Abdomen: soft, non-distended, non-tender, normal bowel sounds, no organomegaly, no masses  Extremities: no focal deformities, no edema    Laboratory/Tests:  Component      Latest Ref Rng & Units 10/20/2017 10/20/2017 10/20/2017 10/20/2017           3:10 PM  3:10 PM  3:10 PM  3:10 PM   Sodium      136 - 145 mmol/L  141     Potassium      3.5 - 5.5 mmol/L  4.3     Chloride      100 - 108 mmol/L  104     CO2      21 - 32 mmol/L  29     Anion gap      3.0 - 18 mmol/L  8     Glucose      74 - 99 mg/dL  76     BUN      7.0 - 18 MG/DL  11     Creatinine      0.6 - 1.3 MG/DL  0.61     BUN/Creatinine ratio      12 - 20    18     GFR est AA      >60 ml/min/1.73m2  >60     GFR est non-AA      >60 ml/min/1.73m2  >60     Calcium      8.5 - 10.1 MG/DL  8.2 (L)     Bilirubin, total 0.2 - 1.0 MG/DL  0.7     ALT (SGPT)      13 - 56 U/L  17     AST      15 - 37 U/L  15     Alk. phosphatase      45 - 117 U/L  100     Protein, total      6.4 - 8.2 g/dL  7.3     Albumin      3.4 - 5.0 g/dL  3.5     Globulin      2.0 - 4.0 g/dL  3.8     A-G Ratio      0.8 - 1.7    0.9     Color       YELLOW      Appearance       CLEAR      Specific gravity      1.005 - 1.030   1.026      pH (UA)      5.0 - 8.0   5.0      Protein      NEG mg/dL NEGATIVE      Glucose      NEG mg/dL NEGATIVE      Ketone      NEG mg/dL NEGATIVE      Bilirubin      NEG   NEGATIVE      Blood      NEG   NEGATIVE      Urobilinogen      0.2 - 1.0 EU/dL 0.2      Nitrites      NEG   NEGATIVE      Leukocyte Esterase      NEG   NEGATIVE      Cholesterol, total      <200 MG/DL   192    Triglyceride      <150 MG/DL   67    HDL Cholesterol      40 - 60 MG/DL   58    LDL, calculated      0 - 100 MG/DL   120.6 (H)    VLDL, calculated      MG/DL   13.4    CHOL/HDL Ratio      0 - 5.0     3.3    Hemoglobin A1c, (calculated)      4.2 - 5.6 %    5.2   Est. average glucose      mg/dL    103       Assessment/Plan:    1. Essential hypertension  - poorly controlled due to poor compliance with diet and medications  - restart amlodipine 10mg daily  - low sodium diet  - weight loss    2. Screening for malignant neoplasm of breast  - GURMEET MAMMO BI SCREENING INCL CAD; Future    3. Chronic tension-type headache, not intractable  - ibuprofen (MOTRIN) 800 mg tablet; Take 1 Tab by mouth every eight (8) hours as needed for Pain. Dispense: 100 Tab; Refill: 0        Follow-up Disposition:  Return in about 6 months (around 9/21/2018) for rov. I have discussed the diagnosis with the patient and the intended plan as seen in the above orders. The patient has received an after-visit summary and questions were answered concerning future plans. I have discussed medication side effects and warnings with the patient as well.  I have reviewed the plan of care with the patient, accepted their input and they are in agreement with the treatment goals.        Tino Sparks MD  March 22, 2018

## 2018-03-21 NOTE — PATIENT INSTRUCTIONS
Thuy Rodrigez MD  [id number] [facility] [address]  [address] [address]  Phone: [phone]  Fax: [phone]

## 2018-03-22 RX ORDER — AMLODIPINE BESYLATE 10 MG/1
10 TABLET ORAL DAILY
Qty: 90 TAB | Refills: 1 | Status: SHIPPED | OUTPATIENT
Start: 2018-03-22 | End: 2018-06-07 | Stop reason: SINTOL

## 2018-03-22 NOTE — TELEPHONE ENCOUNTER
Patient states that she has been taking amlodipine and not metoprolol. Amlodipine refilled and instructed patient not to take metoprolol.

## 2018-04-06 ENCOUNTER — HOSPITAL ENCOUNTER (OUTPATIENT)
Dept: MAMMOGRAPHY | Age: 53
Discharge: HOME | End: 2018-04-06
Attending: INTERNAL MEDICINE
Payer: COMMERCIAL

## 2018-04-06 DIAGNOSIS — Z12.39 SCREENING FOR MALIGNANT NEOPLASM OF BREAST: ICD-10-CM

## 2018-04-06 PROCEDURE — 77063 BREAST TOMOSYNTHESIS BI: CPT

## 2018-05-15 ENCOUNTER — TELEPHONE (OUTPATIENT)
Dept: FAMILY MEDICINE CLINIC | Age: 53
End: 2018-05-15

## 2018-05-15 NOTE — TELEPHONE ENCOUNTER
Patient called and stated she is experiencing chest bad on the left side under her breast. She stated it went away but came back as a sharp pain in the center of her chest. Advised pt to go to the ER or urgent care, upon discharge call office to schedule follow up appt. This encounter will be closed.

## 2018-06-07 ENCOUNTER — OFFICE VISIT (OUTPATIENT)
Dept: FAMILY MEDICINE CLINIC | Age: 53
End: 2018-06-07

## 2018-06-07 VITALS
RESPIRATION RATE: 16 BRPM | HEART RATE: 66 BPM | SYSTOLIC BLOOD PRESSURE: 136 MMHG | HEIGHT: 62 IN | TEMPERATURE: 96.8 F | OXYGEN SATURATION: 100 % | WEIGHT: 216.8 LBS | DIASTOLIC BLOOD PRESSURE: 77 MMHG | BODY MASS INDEX: 39.9 KG/M2

## 2018-06-07 DIAGNOSIS — M54.9 ACUTE BILATERAL BACK PAIN, UNSPECIFIED BACK LOCATION: Primary | ICD-10-CM

## 2018-06-07 DIAGNOSIS — I10 ESSENTIAL HYPERTENSION: Chronic | ICD-10-CM

## 2018-06-07 LAB
BILIRUB UR QL STRIP: NEGATIVE
GLUCOSE UR-MCNC: NEGATIVE MG/DL
KETONES P FAST UR STRIP-MCNC: NEGATIVE MG/DL
PH UR STRIP: 6.5 [PH] (ref 4.6–8)
PROT UR QL STRIP: NEGATIVE
SP GR UR STRIP: 1 (ref 1–1.03)
UA UROBILINOGEN AMB POC: NORMAL (ref 0.2–1)
URINALYSIS CLARITY POC: CLEAR
URINALYSIS COLOR POC: YELLOW
URINE BLOOD POC: NEGATIVE
URINE LEUKOCYTES POC: NEGATIVE
URINE NITRITES POC: NEGATIVE

## 2018-06-07 RX ORDER — LISINOPRIL 10 MG/1
10 TABLET ORAL DAILY
Qty: 90 TAB | Refills: 0 | Status: SHIPPED | OUTPATIENT
Start: 2018-06-07 | End: 2018-11-20 | Stop reason: SDUPTHER

## 2018-06-07 RX ORDER — ACETAMINOPHEN AND CODEINE PHOSPHATE 300; 30 MG/1; MG/1
1 TABLET ORAL
Qty: 20 TAB | Refills: 0 | Status: SHIPPED | OUTPATIENT
Start: 2018-06-07 | End: 2019-05-08

## 2018-06-07 NOTE — PROGRESS NOTES
Chief Complaint   Patient presents with    Back Pain     pt states it feels as if it's near her kidney     1. Have you been to the ER, urgent care clinic since your last visit? Hospitalized since your last visit? No    2. Have you seen or consulted any other health care providers outside of the [address] [place] [address] since your last visit? Include any pap smears or colon screening.  No

## 2018-06-07 NOTE — PROGRESS NOTES
History of Present Illness  Denise Waters is a 46 y.o. female who presents today for management of    Chief Complaint   Patient presents with    Back Pain     pt states it feels as if it's near her kidney       Back Pain  Patient presents for presents evaluation of left low back problems. Symptoms have been present for 2 weeks and include pain in leftlower back (cramping, squeezing in character; 10/10 in severity). Initial inciting event: none. Alleviating factors identifiable by patient are medication ibuprofen 800mg. Exacerbating factors identifiable by patient are standing, sitting, walking, bending forwards. Treatments so far initiated by patient: ibuprofen. Previous lower back problems: none. Previous workup: none. Previous treatments: none. Problem List  Patient Active Problem List    Diagnosis Date Noted    Severe obesity (BMI 35.0-39. 9) with comorbidity ([facility] 03/21/2018    Acute non intractable tension-type headache 09/23/2016    Vitamin D deficiency 01/29/2016    Hyperlipidemia 01/29/2016    Obesity 01/12/2016    Essential hypertension 01/12/2016    Joint stiffness 01/12/2016       Past Medical History  Past Medical History:   Diagnosis Date    Sickle cell trait ([facility]         Surgical History  Past Surgical History:   Procedure Laterality Date    HX TUBAL LIGATION          Current Medications  Current Outpatient Prescriptions   Medication Sig    acetaminophen-codeine (TYLENOL-CODEINE #3) 300-30 mg per tablet Take 1 Tab by mouth every six (6) hours as needed for Pain. Max Daily Amount: 4 Tabs.  lisinopril (PRINIVIL, ZESTRIL) 10 mg tablet Take 1 Tab by mouth daily.  ibuprofen (MOTRIN) 800 mg tablet Take 1 Tab by mouth every eight (8) hours as needed for Pain.  cholecalciferol (VITAMIN D3) 1,000 unit tablet Take 2 Tabs by mouth daily. No current facility-administered medications for this visit.         Allergies/Drug Reactions  Allergies   Allergen Reactions    Shellfish Derived Hives        Family History  Family History   Problem Relation Age of Onset    Heart Disease Father 61    Breast Cancer Other     Diabetes Mother     Hypertension Mother     Hypertension Sister     Diabetes Sister     Cancer Paternal Grandfather         Social History  Social History     Social History    Marital status: SINGLE     Spouse name: N/A    Number of children: N/A    Years of education: N/A     Occupational History          [employer]     Social History Main Topics    Smoking status: Never Smoker    Smokeless tobacco: Never Used    Alcohol use 0.0 oz/week     0 Standard drinks or equivalent per week      Comment: one glass of wine occasionally    Drug use: No    Sexual activity: No     Other Topics Concern    Not on file     Social History Narrative       Review of Systems  General ROS: negative for - chills, fatigue or fever  Respiratory ROS: no cough, shortness of breath, or wheezing  Cardiovascular ROS: no chest pain or dyspnea on exertion  Gastrointestinal ROS: no abdominal pain, change in bowel habits, or black or bloody stools  Genito-Urinary ROS: no dysuria, trouble voiding, or hematuria  positive for - urinary frequency/urgency  Musculoskeletal ROS: positive for - pain in back - lower  Neurological ROS: negative      Physical Exam  Vital signs:   Vitals:    06/07/18 1557   BP: 136/77   Pulse: 66   Resp: 16   Temp: 96.8 °F (36 °C)   TempSrc: Oral   SpO2: 100%   Weight: 216 lb 12.8 oz (98.3 kg)   Height: 5' 2\" (1.575 m)       General: alert, oriented, not in distress  Chest/Lungs: clear breath sounds, no wheezing or crackles  Heart: normal rate, regular rhythm, no murmur  Abdomen: soft, non-distended, non-tender, normal bowel sounds, no organomegaly, no masses  Extremities: no focal deformities, nonpitting bipedal edema  Cervical: Neck is midline. Normal muscle tone. No focal atrophy is noted. ROM pain free. Thoracic: No rash, ecchymosis, or gross obliquity.  No fasciculations. No focal atrophy is noted. No tenderness to palpation. Lumbar: No rash, ecchymosis, or gross obliquity. No fasciculations. No focal atrophy is noted. No pain with hip ROM. Full range of motion. No tenderness to palpation. SI joints non-tender. Trochanters non tender. Straight leg raising negative. Musculoskeletal: strength 5/5 on both upper and lower extremities. Sensation in the bilateral arms grossly intact to light touch. Sensation in the bilateral legs grossly intact to light touch. Laboratory/Tests:  Results for orders placed or performed in visit on 06/07/18   AMB POC URINALYSIS DIP STICK AUTO W/O MICRO     Status: Normal   Result Value Ref Range Status    Color (UA POC) Yellow  Final    Clarity (UA POC) Clear  Final    Glucose (UA POC) Negative Negative Final    Bilirubin (UA POC) Negative Negative Final    Ketones (UA POC) Negative Negative Final    Specific gravity (UA POC) 1.005 1.001 - 1.035 Final    Blood (UA POC) Negative Negative Final     Comment: q    pH (UA POC) 6.5 4.6 - 8.0 Final    Protein (UA POC) Negative Negative Final    Urobilinogen (UA POC) 0.2 mg/dL 0.2 - 1 Final    Nitrites (UA POC) Negative Negative Final    Leukocyte esterase (UA POC) Negative Negative Final       Assessment/Plan:      1. Acute bilateral back pain, unspecified back location, sacroiliac joint pain  - activity as tolerated  - home exercises  - ibuprofen as needed  - AMB POC URINALYSIS DIP STICK AUTO W/O MICRO  - acetaminophen-codeine (TYLENOL-CODEINE #3) 300-30 mg per tablet; Take 1 Tab by mouth every six (6) hours as needed for Pain. Max Daily Amount: 4 Tabs. Dispense: 20 Tab; Refill: 0    2. Essential hypertension  - stop amlodipine due to leg edema  - start lisinopril (PRINIVIL, ZESTRIL) 10 mg tablet; Take 1 Tab by mouth daily. Dispense: 90 Tab;  Refill: 0  - low salt diet    Follow-up Disposition:  Return in about 4 weeks (around 7/5/2018), or if symptoms worsen or fail to improve, for htn, back pain. I have discussed the diagnosis with the patient and the intended plan as seen in the above orders. The patient has received an after-visit summary and questions were answered concerning future plans. I have discussed medication side effects and warnings with the patient as well. I have reviewed the plan of care with the patient, accepted their input and they are in agreement with the treatment goals.        Kuldeep Rivera MD  June 7, 2018

## 2018-06-07 NOTE — PATIENT INSTRUCTIONS
Sacroiliac Pain: Exercises  Your Care Instructions  Here are some examples of typical rehabilitation exercises for your condition. Start each exercise slowly. Ease off the exercise if you start to have pain. Your doctor or physical therapist will tell you when you can start these exercises and which ones will work best for you. How to do the exercises  Knee-to-chest stretch    1. Do not do the knee-to-chest exercise if it causes or increases back or leg pain. 2. Lie on your back with your knees bent and your feet flat on the floor. You can put a small pillow under your head and neck if it is more comfortable. 3. Grasp your hands under one knee and bring the knee to your chest, keeping the other foot flat on the floor. 4. Keep your lower back pressed to the floor. Hold for at least 15 to 30 seconds. 5. Relax and lower the knee to the starting position. Repeat with the other leg. 6. Repeat 2 to 4 times with each leg. 7. To get more stretch, keep your other leg flat on the floor while pulling your knee to your chest.  Bridging    1. Lie on your back with both knees bent. Your knees should be bent about 90 degrees. 2. Tighten your belly muscles by pulling in your belly button toward your spine. Then push your feet into the floor, squeeze your buttocks, and lift your hips off the floor until your shoulders, hips, and knees are all in a straight line. 3. Hold for about 6 seconds as you continue to breathe normally, and then slowly lower your hips back down to the floor and rest for up to 10 seconds. 4. Repeat 8 to 12 times. Hip extension    1. Get down on your hands and knees on the floor. 2. Keeping your back and neck straight, lift one leg straight out behind you. When you lift your leg, keep your hips level. Don't let your back twist, and don't let your hip drop toward the floor. 3. Hold for 6 seconds. Repeat 8 to 12 times with each leg.   4. If you feel steady and strong when you do this exercise, you can make it more difficult. To do this, when you lift your leg, also lift the opposite arm straight out in front of you. For example, lift the left leg and the right arm at the same time. (This is sometimes called the \"bird dog exercise. \") Hold for 6 seconds, and repeat 8 to 12 times on each side. Clamshell    1. Lie on your side with a pillow under your head. Keep your feet and knees together and your knees bent. 2. Raise your top knee, but keep your feet together. Do not let your hips roll back. Your legs should open up like a clamshell. 3. Hold for 6 seconds. 4. Slowly lower your knee back down. Rest for 10 seconds. 5. Repeat 8 to 12 times. 6. Switch to your other side and repeat steps 1 through 5. Hamstring wall stretch    1. Lie on your back in a doorway, with one leg through the open door. 2. Slide your affected leg up the wall to straighten your knee. You should feel a gentle stretch down the back of your leg. 1. Do not arch your back. 2. Do not bend either knee. 3. Keep one heel touching the floor and the other heel touching the wall. Do not point your toes. 3. Hold the stretch for at least 1 minute to begin. Then try to lengthen the time you hold the stretch to as long as 6 minutes. 4. Switch legs, and repeat steps 1 through 3.  5. Repeat 2 to 4 times. 6. If you do not have a place to do this exercise in a doorway, there is another way to do it:  7. Lie on your back, and bend one knee. 8. Loop a towel under the ball and toes of that foot, and hold the ends of the towel in your hands. 9. Straighten your knee, and slowly pull back on the towel. You should feel a gentle stretch down the back of your leg. 10. Switch legs, and repeat steps 1 through 3.  11. Repeat 2 to 4 times. Lower abdominal strengthening    1. Lie on your back with your knees bent and your feet flat on the floor. 2. Tighten your belly muscles by pulling your belly button in toward your spine.   3. Lift one foot off the floor and bring your knee toward your chest, so that your knee is straight above your hip and your leg is bent like the letter \"L. \"  4. Lift the other knee up to the same position. 5. Lower one leg at a time to the starting position. 6. Keep alternating legs until you have lifted each leg 8 to 12 times. 7. Be sure to keep your belly muscles tight and your back still as you are moving your legs. Be sure to breathe normally. Piriformis stretch    1. Lie on your back with your legs straight. 2. Lift your affected leg, and bend your knee. With your opposite hand, reach across your body, and then gently pull your knee toward your opposite shoulder. 3. Hold the stretch for 15 to 30 seconds. 4. Switch legs and repeat steps 1 through 3.  5. Repeat 2 to 4 times. Follow-up care is a key part of your treatment and safety. Be sure to make and go to all appointments, and call your doctor if you are having problems. It's also a good idea to know your test results and keep a list of the medicines you take. Where can you learn more? Go to http://oli-anne.info/. Enter L873 in the search box to learn more about \"Sacroiliac Pain: Exercises. \"  Current as of: March 21, 2017  Content Version: 11.4  © 1253-7138 [employer]. Care instructions adapted under license by [employer] (which disclaims liability or warranty for this information). If you have questions about a medical condition or this instruction, always ask your healthcare professional. [address] any warranty or liability for your use of this information.

## 2018-06-07 NOTE — MR AVS SNAPSHOT
[address] [address] [address] [address] 
[phone] Patient: Olegario Mary MRN: [id number] :1965 Visit Information Date & Time Provider Department Dept. Phone Encounter #  
 2018  3:45 PM Oscar Phani Cárdenas 6 [phone] [id number] Follow-up Instructions Return in about 4 weeks (around 2018), or if symptoms worsen or fail to improve, for htn, back pain. Upcoming Health Maintenance Date Due DTaP/Tdap/Td series (1 - Tdap) 1985 FOBT Q 1 YEAR AGE 50-75 2014 Influenza Age 5 to Adult 2018 PAP AKA CERVICAL CYTOLOGY 2/10/2019 BREAST CANCER SCRN MAMMOGRAM 2020 Allergies as of 2018  Review Complete On: 2018 By: Oscar Cárdenas MD  
  
 Severity Noted Reaction Type Reactions Shellfish Derived  2016    Hives Current Immunizations  Reviewed on 2016 Name Date Influenza Vaccine (Quad) 2016 11:10 AM  
  
 Not reviewed this visit You Were Diagnosed With   
  
 Codes Comments Acute bilateral back pain, unspecified back location    -  Primary ICD-10-CM: M54.9 ICD-9-CM: 724.5 Essential hypertension     ICD-10-CM: I10 
ICD-9-CM: 401.9 Vitals BP Pulse Temp Resp Height(growth percentile) Weight(growth percentile) 136/77 66 96.8 °F (36 °C) (Oral) 16 5' 2\" (1.575 m) 216 lb 12.8 oz (98.3 kg) SpO2 BMI OB Status Smoking Status 100% 39.65 kg/m2 Menopause Never Smoker Vitals History BMI and BSA Data Body Mass Index Body Surface Area  
 39.65 kg/m 2 2.07 m 2 Preferred Pharmacy Pharmacy Name Phone [facility] [phone] Your Updated Medication List  
  
   
This list is accurate as of 18  4:26 PM.  Always use your most recent med list.  
  
  
  
  
 acetaminophen-codeine 300-30 mg per tablet Commonly known as:  TYLENOL-CODEINE #3 Take 1 Tab by mouth every six (6) hours as needed for Pain. Max Daily Amount: 4 Tabs. cholecalciferol 1,000 unit tablet Commonly known as:  VITAMIN D3 Take 2 Tabs by mouth daily. ibuprofen 800 mg tablet Commonly known as:  MOTRIN Take 1 Tab by mouth every eight (8) hours as needed for Pain. lisinopril 10 mg tablet Commonly known as:  [place] Take 1 Tab by mouth daily. Prescriptions Printed Refills  
 acetaminophen-codeine (TYLENOL-CODEINE #3) 300-30 mg per tablet 0 Sig: Take 1 Tab by mouth every six (6) hours as needed for Pain. Max Daily Amount: 4 Tabs. Class: Print Route: Oral  
  
Prescriptions Sent to Pharmacy Refills  
 lisinopril (PRINIVIL, ZESTRIL) 10 mg tablet 0 Sig: Take 1 Tab by mouth daily. Class: Normal  
 Pharmacy: [facility] [address] [place] [address] #: [phone] Route: Oral  
  
We Performed the Following AMB POC URINALYSIS DIP STICK AUTO W/O MICRO [82944 CPT(R)] Follow-up Instructions Return in about 4 weeks (around 7/5/2018), or if symptoms worsen or fail to improve, for htn, back pain. Patient Instructions Sacroiliac Pain: Exercises Your Care Instructions Here are some examples of typical rehabilitation exercises for your condition. Start each exercise slowly. Ease off the exercise if you start to have pain. Your doctor or physical therapist will tell you when you can start these exercises and which ones will work best for you. How to do the exercises Knee-to-chest stretch 1. Do not do the knee-to-chest exercise if it causes or increases back or leg pain. 2. Lie on your back with your knees bent and your feet flat on the floor. You can put a small pillow under your head and neck if it is more comfortable.  
3. Grasp your hands under one knee and bring the knee to your chest, keeping the other foot flat on the floor. 4. Keep your lower back pressed to the floor. Hold for at least 15 to 30 seconds. 5. Relax and lower the knee to the starting position. Repeat with the other leg. 6. Repeat 2 to 4 times with each leg. 7. To get more stretch, keep your other leg flat on the floor while pulling your knee to your chest. 
Bridging 1. Lie on your back with both knees bent. Your knees should be bent about 90 degrees. 2. Tighten your belly muscles by pulling in your belly button toward your spine. Then push your feet into the floor, squeeze your buttocks, and lift your hips off the floor until your shoulders, hips, and knees are all in a straight line. 3. Hold for about 6 seconds as you continue to breathe normally, and then slowly lower your hips back down to the floor and rest for up to 10 seconds. 4. Repeat 8 to 12 times. Hip extension 1. Get down on your hands and knees on the floor. 2. Keeping your back and neck straight, lift one leg straight out behind you. When you lift your leg, keep your hips level. Don't let your back twist, and don't let your hip drop toward the floor. 3. Hold for 6 seconds. Repeat 8 to 12 times with each leg. 4. If you feel steady and strong when you do this exercise, you can make it more difficult. To do this, when you lift your leg, also lift the opposite arm straight out in front of you. For example, lift the left leg and the right arm at the same time. (This is sometimes called the \"bird dog exercise. \") Hold for 6 seconds, and repeat 8 to 12 times on each side. Clamshell 1. Lie on your side with a pillow under your head. Keep your feet and knees together and your knees bent. 2. Raise your top knee, but keep your feet together. Do not let your hips roll back. Your legs should open up like a clamshell. 3. Hold for 6 seconds. 4. Slowly lower your knee back down. Rest for 10 seconds. 5. Repeat 8 to 12 times. 6. Switch to your other side and repeat steps 1 through 5. Hamstring wall stretch 1. Lie on your back in a doorway, with one leg through the open door. 2. Slide your affected leg up the wall to straighten your knee. You should feel a gentle stretch down the back of your leg. 1. Do not arch your back. 2. Do not bend either knee. 3. Keep one heel touching the floor and the other heel touching the wall. Do not point your toes. 3. Hold the stretch for at least 1 minute to begin. Then try to lengthen the time you hold the stretch to as long as 6 minutes. 4. Switch legs, and repeat steps 1 through 3. 
5. Repeat 2 to 4 times. 6. If you do not have a place to do this exercise in a doorway, there is another way to do it: 
7. Lie on your back, and bend one knee. 8. Loop a towel under the ball and toes of that foot, and hold the ends of the towel in your hands. 9. Straighten your knee, and slowly pull back on the towel. You should feel a gentle stretch down the back of your leg. 10. Switch legs, and repeat steps 1 through 3. 
11. Repeat 2 to 4 times. Lower abdominal strengthening 1. Lie on your back with your knees bent and your feet flat on the floor. 2. Tighten your belly muscles by pulling your belly button in toward your spine. 3. Lift one foot off the floor and bring your knee toward your chest, so that your knee is straight above your hip and your leg is bent like the letter \"L. \" 
4. Lift the other knee up to the same position. 5. Lower one leg at a time to the starting position. 6. Keep alternating legs until you have lifted each leg 8 to 12 times. 7. Be sure to keep your belly muscles tight and your back still as you are moving your legs. Be sure to breathe normally. Piriformis stretch 1. Lie on your back with your legs straight. 2. Lift your affected leg, and bend your knee. With your opposite hand, reach across your body, and then gently pull your knee toward your opposite shoulder. 3. Hold the stretch for 15 to 30 seconds. 4. Switch legs and repeat steps 1 through 3. 
5. Repeat 2 to 4 times. Follow-up care is a key part of your treatment and safety. Be sure to make and go to all appointments, and call your doctor if you are having problems. It's also a good idea to know your test results and keep a list of the medicines you take. Where can you learn more? Go to http://oli-anne.info/. Enter N602 in the search box to learn more about \"Sacroiliac Pain: Exercises. \" Current as of: March 21, 2017 Content Version: 11.4 © 4064-3519 [employer]. Care instructions adapted under license by [employer] (which disclaims liability or warranty for this information). If you have questions about a medical condition or this instruction, always ask your healthcare professional. [place] [address] any warranty or liability for your use of this information. Introducing [facility]! [employer] introduces [employer] patient portal. Now you can access parts of your medical record, email your doctor's office, and request medication refills online. 1. In your internet browser, go to [email or web address] 2. Click on the First Time User? Click Here link in the Sign In box. You will see the New Member Sign Up page. 3. Enter your Tell[employer]ence Access Code exactly as it appears below. You will not need to use this code after youve completed the sign-up process. If you do not sign up before the expiration date, you must request a new code. · Tell[employer]ence Access Code: [email or web address] Expires: 6/19/2018 10:03 AM 
 
4. Enter the last four digits of your Social Security Number (xxxx) and Date of Birth (mm/dd/yyyy) as indicated and click Submit. You will be taken to the next sign-up page. 5. Create a ONDiGO Mobile CRMt ID. This will be your Tellagence login ID and cannot be changed, so think of one that is secure and easy to remember. 6. Create a OMNIlife science password. You can change your password at any time. 7. Enter your Password Reset Question and Answer. This can be used at a later time if you forget your password. 8. Enter your e-mail address. You will receive e-mail notification when new information is available in [address] 9. Click Sign Up. You can now view and download portions of your medical record. 10. Click the Download Summary menu link to download a portable copy of your medical information. If you have questions, please visit the Frequently Asked Questions section of the OM[employer] website. Remember, [employer] is NOT to be used for urgent needs. For medical emergencies, dial 911. Now available from your iPhone and Android! Please provide this summary of care documentation to your next provider. Your primary care clinician is listed as Devang Frazier. If you have any questions after today's visit, please call [phone].

## 2018-11-20 DIAGNOSIS — I10 ESSENTIAL HYPERTENSION: Chronic | ICD-10-CM

## 2018-11-20 RX ORDER — LISINOPRIL 10 MG/1
10 TABLET ORAL DAILY
Qty: 90 TAB | Refills: 1 | Status: SHIPPED | OUTPATIENT
Start: 2018-11-20 | End: 2019-05-08 | Stop reason: SDUPTHER

## 2018-11-20 NOTE — TELEPHONE ENCOUNTER
Requested Prescriptions     Pending Prescriptions Disp Refills    lisinopril (PRINIVIL, ZESTRIL) 10 mg tablet 90 Tab 0     Sig: Take 1 Tab by mouth daily. Please advise, thank you.

## 2019-05-08 ENCOUNTER — OFFICE VISIT (OUTPATIENT)
Dept: FAMILY MEDICINE CLINIC | Age: 54
End: 2019-05-08

## 2019-05-08 ENCOUNTER — HOSPITAL ENCOUNTER (OUTPATIENT)
Dept: LAB | Age: 54
Discharge: HOME | End: 2019-05-08
Payer: COMMERCIAL

## 2019-05-08 VITALS
OXYGEN SATURATION: 98 % | WEIGHT: 209.6 LBS | HEART RATE: 74 BPM | HEIGHT: 62 IN | BODY MASS INDEX: 38.57 KG/M2 | DIASTOLIC BLOOD PRESSURE: 90 MMHG | TEMPERATURE: 97.4 F | RESPIRATION RATE: 20 BRPM | SYSTOLIC BLOOD PRESSURE: 130 MMHG

## 2019-05-08 DIAGNOSIS — I10 ESSENTIAL HYPERTENSION: Chronic | ICD-10-CM

## 2019-05-08 DIAGNOSIS — Z00.00 ROUTINE GENERAL MEDICAL EXAMINATION AT A HEALTH CARE FACILITY: ICD-10-CM

## 2019-05-08 DIAGNOSIS — Z00.00 ROUTINE GENERAL MEDICAL EXAMINATION AT A HEALTH CARE FACILITY: Primary | ICD-10-CM

## 2019-05-08 DIAGNOSIS — Z12.39 SCREENING FOR MALIGNANT NEOPLASM OF BREAST: ICD-10-CM

## 2019-05-08 DIAGNOSIS — E66.01 SEVERE OBESITY (BMI 35.0-39.9) WITH COMORBIDITY (HCC): ICD-10-CM

## 2019-05-08 DIAGNOSIS — E55.9 VITAMIN D DEFICIENCY: ICD-10-CM

## 2019-05-08 DIAGNOSIS — G44.229 CHRONIC TENSION-TYPE HEADACHE, NOT INTRACTABLE: ICD-10-CM

## 2019-05-08 LAB
25(OH)D3 SERPL-MCNC: 15.5 NG/ML (ref 30–100)
ALBUMIN SERPL-MCNC: 3.4 G/DL (ref 3.4–5)
ALBUMIN/GLOB SERPL: 0.9 {RATIO} (ref 0.8–1.7)
ALP SERPL-CCNC: 103 U/L (ref 45–117)
ALT SERPL-CCNC: 20 U/L (ref 13–56)
ANION GAP SERPL CALC-SCNC: 5 MMOL/L (ref 3–18)
APPEARANCE UR: CLEAR
AST SERPL-CCNC: 14 U/L (ref 15–37)
BASOPHILS # BLD: 0 K/UL (ref 0–0.1)
BASOPHILS NFR BLD: 0 % (ref 0–2)
BILIRUB SERPL-MCNC: 0.7 MG/DL (ref 0.2–1)
BILIRUB UR QL: NEGATIVE
BUN SERPL-MCNC: 12 MG/DL (ref 7–18)
BUN/CREAT SERPL: 17 (ref 12–20)
CALCIUM SERPL-MCNC: 8.4 MG/DL (ref 8.5–10.1)
CHLORIDE SERPL-SCNC: 106 MMOL/L (ref 100–108)
CHOLEST SERPL-MCNC: 186 MG/DL
CO2 SERPL-SCNC: 27 MMOL/L (ref 21–32)
COLOR UR: YELLOW
CREAT SERPL-MCNC: 0.69 MG/DL (ref 0.6–1.3)
DIFFERENTIAL METHOD BLD: ABNORMAL
EOSINOPHIL # BLD: 0.1 K/UL (ref 0–0.4)
EOSINOPHIL NFR BLD: 3 % (ref 0–5)
ERYTHROCYTE [DISTWIDTH] IN BLOOD BY AUTOMATED COUNT: 12.9 % (ref 11.6–14.5)
EST. AVERAGE GLUCOSE BLD GHB EST-MCNC: 100 MG/DL
GLOBULIN SER CALC-MCNC: 3.9 G/DL (ref 2–4)
GLUCOSE SERPL-MCNC: 74 MG/DL (ref 74–99)
GLUCOSE UR STRIP.AUTO-MCNC: NEGATIVE MG/DL
HBA1C MFR BLD: 5.1 % (ref 4.2–5.6)
HCT VFR BLD AUTO: 38.5 % (ref 35–45)
HDLC SERPL-MCNC: 54 MG/DL (ref 40–60)
HDLC SERPL: 3.4 {RATIO} (ref 0–5)
HGB BLD-MCNC: 12.2 G/DL (ref 12–16)
HGB UR QL STRIP: NEGATIVE
KETONES UR QL STRIP.AUTO: NEGATIVE MG/DL
LDLC SERPL CALC-MCNC: 118.2 MG/DL (ref 0–100)
LEUKOCYTE ESTERASE UR QL STRIP.AUTO: NEGATIVE
LIPID PROFILE,FLP: ABNORMAL
LYMPHOCYTES # BLD: 1.3 K/UL (ref 0.9–3.6)
LYMPHOCYTES NFR BLD: 49 % (ref 21–52)
MCH RBC QN AUTO: 27.9 PG (ref 24–34)
MCHC RBC AUTO-ENTMCNC: 31.7 G/DL (ref 31–37)
MCV RBC AUTO: 87.9 FL (ref 74–97)
MONOCYTES # BLD: 0.2 K/UL (ref 0.05–1.2)
MONOCYTES NFR BLD: 7 % (ref 3–10)
NEUTS SEG # BLD: 1.1 K/UL (ref 1.8–8)
NEUTS SEG NFR BLD: 41 % (ref 40–73)
NITRITE UR QL STRIP.AUTO: NEGATIVE
PH UR STRIP: 6 [PH] (ref 5–8)
PLATELET # BLD AUTO: 372 K/UL (ref 135–420)
PMV BLD AUTO: 9.2 FL (ref 9.2–11.8)
POTASSIUM SERPL-SCNC: 4 MMOL/L (ref 3.5–5.5)
PROT SERPL-MCNC: 7.3 G/DL (ref 6.4–8.2)
PROT UR STRIP-MCNC: NEGATIVE MG/DL
RBC # BLD AUTO: 4.38 M/UL (ref 4.2–5.3)
SODIUM SERPL-SCNC: 138 MMOL/L (ref 136–145)
SP GR UR REFRACTOMETRY: 1.03 (ref 1–1.03)
T4 FREE SERPL-MCNC: 1 NG/DL (ref 0.7–1.5)
TRIGL SERPL-MCNC: 69 MG/DL (ref ?–150)
TSH SERPL DL<=0.05 MIU/L-ACNC: 1.08 UIU/ML (ref 0.36–3.74)
UROBILINOGEN UR QL STRIP.AUTO: 0.2 EU/DL (ref 0.2–1)
VLDLC SERPL CALC-MCNC: 13.8 MG/DL
WBC # BLD AUTO: 2.7 K/UL (ref 4.6–13.2)

## 2019-05-08 PROCEDURE — 85025 COMPLETE CBC W/AUTO DIFF WBC: CPT

## 2019-05-08 PROCEDURE — 36415 COLL VENOUS BLD VENIPUNCTURE: CPT

## 2019-05-08 PROCEDURE — 82306 VITAMIN D 25 HYDROXY: CPT

## 2019-05-08 PROCEDURE — 84439 ASSAY OF FREE THYROXINE: CPT

## 2019-05-08 PROCEDURE — 81003 URINALYSIS AUTO W/O SCOPE: CPT

## 2019-05-08 PROCEDURE — 80061 LIPID PANEL: CPT

## 2019-05-08 PROCEDURE — 80053 COMPREHEN METABOLIC PANEL: CPT

## 2019-05-08 PROCEDURE — 83036 HEMOGLOBIN GLYCOSYLATED A1C: CPT

## 2019-05-08 RX ORDER — LISINOPRIL 10 MG/1
10 TABLET ORAL DAILY
Qty: 90 TAB | Refills: 3 | Status: SHIPPED | OUTPATIENT
Start: 2019-05-08 | End: 2019-07-25 | Stop reason: SDUPTHER

## 2019-05-08 RX ORDER — IBUPROFEN 800 MG/1
800 TABLET ORAL
Qty: 100 TAB | Refills: 0 | Status: SHIPPED | OUTPATIENT
Start: 2019-05-08 | End: 2020-01-02 | Stop reason: SDUPTHER

## 2019-05-08 NOTE — PROGRESS NOTES
ANNUAL PHYSICAL EXAMINATION History of Present Illness Yifan Browning is a 53 y.o. female who presents today for management of 
 
Chief Complaint Patient presents with  Physical  
 
Patient here for routine exam.  Current Complaints: intermittent lower abdominal pain, described as cramps. Gynecologic History Patient's last menstrual period was No LMP recorded. (Menstrual status: Menopause). Karo Briggs Last Pap: 2016 Results: normal 
Last Mammogram: 4/2018 Results: normal 
 
Health Maintenance Colon cancer: due Dyslipidemia: due Diabetes mellitus: due Influenza vaccine: due in the fall Pneumococcal vaccine: not indicated Tdap: due Herpes Zoster vaccine: due Hep B vaccine: not indicated   
Weight:  Body mass index is Estimated body mass index is Body mass index is 38.34 kg/m². Karo Briggs Discussed the patient's BMI with her.  The BMI follow up plan is as follows: Improve diet and 30 min of moderate activity at least 5 times a week. Diet: no 
Exercise: no regular exercise Seatbelt: yes Dentist: no 
 
 
Past Medical History Past Medical History:  
Diagnosis Date  Sickle cell trait ([facility] [address] Surgical History Past Surgical History:  
Procedure Laterality Date  HX TUBAL LIGATION Current Medications Current Outpatient Medications Medication Sig  
 lisinopril (PRINIVIL, ZESTRIL) 10 mg tablet Take 1 Tab by mouth daily.  ibuprofen (MOTRIN) 800 mg tablet Take 1 Tab by mouth every eight (8) hours as needed for Pain.  cholecalciferol (VITAMIN D3) 1,000 unit tablet Take 2 Tabs by mouth daily. No current facility-administered medications for this visit. Allergies/Drug Reactions Allergies Allergen Reactions  Shellfish Derived Hives Family History Family History Problem Relation Age of Onset  Heart Disease Father 61  Breast Cancer Other  Diabetes Mother  Hypertension Mother  Hypertension Sister  Diabetes Sister  Cancer Paternal Grandfather Social History Social History Socioeconomic History  Marital status: SINGLE Spouse name: Not on file  Number of children: Not on file  Years of education: Not on file  Highest education level: Not on file Occupational History Comment: [employer]  
Social Needs  Financial resource strain: Not on file  Food insecurity:  
  Worry: Not on file Inability: Not on file  Transportation needs:  
  Medical: Not on file Non-medical: Not on file Tobacco Use  Smoking status: Never Smoker  Smokeless tobacco: Never Used Substance and Sexual Activity  Alcohol use: Yes Alcohol/week: 0.0 oz  
  Comment: one glass of wine occasionally  Drug use: No  
 Sexual activity: Never Lifestyle  Physical activity:  
  Days per week: Not on file Minutes per session: Not on file  Stress: Not on file Relationships  Social connections:  
  Talks on phone: Not on file Gets together: Not on file Attends Orthodoxy service: Not on file Active member of club or organization: Not on file Attends meetings of clubs or organizations: Not on file Relationship status: Not on file  Intimate partner violence:  
  Fear of current or ex partner: Not on file Emotionally abused: Not on file Physically abused: Not on file Forced sexual activity: Not on file Other Topics Concern  Not on file Social History Narrative  Not on file Health Maintenance Topic Date Due  
 DTaP/Tdap/Td series (1 - Tdap) 02/09/1985  Shingrix Vaccine Age 50> (1 of 2) 02/09/2014  PAP AKA CERVICAL CYTOLOGY  02/10/2019  Influenza Age 5 to Adult  08/01/2019  BREAST CANCER SCRN MAMMOGRAM  04/06/2020  COLONOSCOPY  05/14/2022  Hepatitis C Screening  Completed  Pneumococcal 0-64 years  Aged Out Immunization History Administered Date(s) Administered  Influenza High Dose Vaccine PF 01/01/2016  Influenza Vaccine Ritikajaskaran [place]) 01/12/2016 Review of Systems General ROS: negative for - chills, fatigue or fever Psychological ROS: negative Ophthalmic ROS: positive for - uses glasses ENT ROS: negative Allergy and Immunology ROS: negative Hematological and Lymphatic ROS: negative Endocrine ROS: negative Breast ROS: negative for breast lumps Respiratory ROS: no cough, shortness of breath, or wheezing Cardiovascular ROS: no chest pain or dyspnea on exertion Gastrointestinal ROS: positive for - abdominal pain 
negative for - blood in stools, change in bowel habits, change in stools, constipation, diarrhea, melena or nausea/vomiting Genito-Urinary ROS: no dysuria, trouble voiding, or hematuria Musculoskeletal ROS: negative Neurological ROS: negative Dermatological ROS: negative [facility] [address] [facility] Right [facility]:   [address] Left ear:   [address] Visual Acuity Screening Right eye Left eye Both eyes Without correction:     
With correction: 20/20 20/20 20/15 Physical Exam 
Vital signs:  
Vitals:  
 05/08/19 7808 BP: 130/90 Pulse: 74 Resp: 20 Temp: 97.4 °F (36.3 °C) TempSrc: Oral  
SpO2: 98% Weight: 209 lb 9.6 oz (95.1 kg) Height: 5' 2\" (1.575 m) General: alert, oriented, not in distress Head: scalp normal, atraumatic Eyes: pupils are equal and reactive, full and intact EOM's 
Ears: patent ear canal, intact tympanic membrane Nose: normal turbinates, no congestion or discharge 
Lips/Mouth: moist lips and buccal mucosa, non-enlarged tonsils, pink throat Neck: supple, no JVD, no lymphadenopathy, non-palpable thyroid Chest/Lungs: clear breath sounds, no wheezing or crackles Breasts: right breast normal without mass, skin or nipple changes or axillary nodes, left breast normal without mass, skin or nipple changes or axillary nodes Heart: normal rate, regular rhythm, no murmur Abdomen: soft, non-distended, non-tender, normal bowel sounds, no organomegaly, no masses Extremities: no focal deformities, no edema Skin: no active skin lesions Assessment/Plan: ICD-10-CM ICD-9-CM 1. Routine general medical examination at a health care facility Z00.00 V70.0 CBC WITH AUTOMATED DIFF  
   HEMOGLOBIN A1C WITH EAG  
   LIPID PANEL  
   METABOLIC PANEL, COMPREHENSIVE  
   TSH AND FREE T4  
   URINALYSIS W/ RFLX MICROSCOPIC  
   VITAMIN D, 25 HYDROXY 2. Essential hypertension I10 401.9 CBC WITH AUTOMATED DIFF  
   HEMOGLOBIN A1C WITH EAG  
   LIPID PANEL  
   METABOLIC PANEL, COMPREHENSIVE  
   TSH AND FREE T4  
   URINALYSIS W/ RFLX MICROSCOPIC  
   lisinopril (PRINIVIL, ZESTRIL) 10 mg tablet 3. Vitamin D deficiency E55.9 268.9 VITAMIN D, 25 HYDROXY 4. Severe obesity (BMI 35.0-39. 9) with comorbidity ([facility] [address] E66.01 278.01   
5. Chronic tension-type headache, not intractable G44.229 339.12 ibuprofen (MOTRIN) 800 mg tablet 6. Screening for malignant neoplasm of breast Z12.31 V76.10 [facility] MAMMO BI SCREENING INCL CAD  
 
HTN - controlled Patient Counseling: 
--Nutrition: Stressed importance of moderation in sodium/caffeine intake, saturated fat and cholesterol, caloric balance, sufficient intake of fresh fruits, vegetables, fiber, calcium, iron, and 1 mg of folate supplement per day (for females capable of pregnancy). --Discussed the issue of estrogen replacement, calcium supplement, and the daily use of baby aspirin. --Exercise: Stressed the importance of regular exercise. --Substance Abuse: Discussed cessation/primary prevention of tobacco, alcohol, or other drug use; driving or other dangerous activities under the influence; availability of treatment for abuse. --Sexuality: Discussed sexually transmitted diseases, partner selection, use of condoms, avoidance of unintended pregnancy and contraceptive alternatives. --Injury prevention: Discussed safety belts, safety helmets, smoke detector, smoking near bedding or upholstery. --Dental health: Discussed importance of regular tooth brushing, flossing, and dental visits. --Immunizations reviewed. --Discussed benefits of screening colonoscopy. --After hours service discussed with patient I have discussed the diagnosis with the patient and the intended plan as seen in the above orders. The patient has received an after-visit summary and questions were answered concerning future plans. I have discussed medication side effects and warnings with the patient as well. I have reviewed the plan of care with the patient, accepted their input and they are in agreement with the treatment goals. Alyson Villalobos MD 
May 8, 2019

## 2019-05-08 NOTE — PROGRESS NOTES
1. Have you been to the ER, urgent care clinic since your last visit? Hospitalized since your last visit? No 
 
2. Have you seen or consulted any other health care providers outside of the [address] [place] [address] since your last visit? Include any pap smears or colon screening.  No

## 2019-05-09 ENCOUNTER — TELEPHONE (OUTPATIENT)
Dept: FAMILY MEDICINE CLINIC | Age: 54
End: 2019-05-09

## 2019-05-09 DIAGNOSIS — D70.9 NEUTROPENIA, UNSPECIFIED TYPE (HCC): Primary | ICD-10-CM

## 2019-05-09 PROBLEM — D70.8 OTHER NEUTROPENIA (HCC): Status: ACTIVE | Noted: 2019-05-09

## 2019-05-09 NOTE — TELEPHONE ENCOUNTER
Pt called in and stated she had just spoken with Dr. Rambo Weinstein in regards to her blood results. She was wanting to speak with her again in regards to a question she had forgotten to ask. Please advise.

## 2019-05-09 NOTE — TELEPHONE ENCOUNTER
Returned patient call. Informed patient of possible cause of neutropenia including current infection, familial, congenital or idiopathic. Advised to complete repeat blood work. If WBC persistently low, will refer to hematology for further evaluation.

## 2019-05-09 NOTE — TELEPHONE ENCOUNTER
Called patient and informed her that WBC came back decreased. Advised to repeat CBC with manual diff in 1-2 weeks. Will also check vitamin J00, folic acid, copper, NEGRO and HIV screening. Patient verbalized understanding.

## 2019-05-13 ENCOUNTER — HOSPITAL ENCOUNTER (OUTPATIENT)
Dept: LAB | Age: 54
Discharge: HOME | End: 2019-05-13
Payer: COMMERCIAL

## 2019-05-13 DIAGNOSIS — D70.9 NEUTROPENIA, UNSPECIFIED TYPE (HCC): ICD-10-CM

## 2019-05-13 LAB
BASOPHILS # BLD: 0 K/UL (ref 0–0.1)
BASOPHILS NFR BLD: 0 % (ref 0–3)
BLASTS NFR BLD MANUAL: 0 %
DIFFERENTIAL METHOD BLD: ABNORMAL
EOSINOPHIL # BLD: 0 K/UL (ref 0–0.4)
EOSINOPHIL NFR BLD: 0 % (ref 0–5)
ERYTHROCYTE [DISTWIDTH] IN BLOOD BY AUTOMATED COUNT: 12.7 % (ref 11.6–14.5)
HCT VFR BLD AUTO: 36.6 % (ref 35–45)
HGB BLD-MCNC: 12.1 G/DL (ref 12–16)
LYMPHOCYTES # BLD: 1.6 K/UL (ref 0.8–3.5)
LYMPHOCYTES NFR BLD: 62 % (ref 20–51)
MANUAL DIFFERENTIAL PERFORMED BLD QL: ABNORMAL
MCH RBC QN AUTO: 28.3 PG (ref 24–34)
MCHC RBC AUTO-ENTMCNC: 33.1 G/DL (ref 31–37)
MCV RBC AUTO: 85.5 FL (ref 74–97)
METAMYELOCYTES NFR BLD MANUAL: 0 %
MONOCYTES # BLD: 0.1 K/UL (ref 0–1)
MONOCYTES NFR BLD: 4 % (ref 2–9)
MYELOCYTES NFR BLD MANUAL: 0 %
NEUTS BAND NFR BLD MANUAL: 0 % (ref 0–5)
NEUTS SEG # BLD: 0.9 K/UL (ref 1.8–8)
NEUTS SEG NFR BLD: 34 % (ref 42–75)
PLATELET # BLD AUTO: 395 K/UL (ref 135–420)
PLATELET COMMENTS,PCOM: ADEQUATE
PMV BLD AUTO: 9.2 FL (ref 9.2–11.8)
PROMYELOCYTES NFR BLD MANUAL: 0 %
RBC # BLD AUTO: 4.28 M/UL (ref 4.2–5.3)
RBC MORPH BLD: ABNORMAL
WBC # BLD AUTO: 2.6 K/UL (ref 4.6–13.2)

## 2019-05-13 PROCEDURE — 82525 ASSAY OF COPPER: CPT

## 2019-05-13 PROCEDURE — 36415 COLL VENOUS BLD VENIPUNCTURE: CPT

## 2019-05-13 PROCEDURE — 82607 VITAMIN B-12: CPT

## 2019-05-13 PROCEDURE — 85027 COMPLETE CBC AUTOMATED: CPT

## 2019-05-13 PROCEDURE — 87389 HIV-1 AG W/HIV-1&-2 AB AG IA: CPT

## 2019-05-13 PROCEDURE — 86038 ANTINUCLEAR ANTIBODIES: CPT

## 2019-05-14 ENCOUNTER — TELEPHONE (OUTPATIENT)
Dept: FAMILY MEDICINE CLINIC | Age: 54
End: 2019-05-14

## 2019-05-14 DIAGNOSIS — D70.9 NEUTROPENIA, UNSPECIFIED TYPE (HCC): Primary | ICD-10-CM

## 2019-05-14 LAB
FOLATE SERPL-MCNC: 14.2 NG/ML (ref 3.1–17.5)
VIT B12 SERPL-MCNC: 663 PG/ML (ref 211–911)

## 2019-05-14 NOTE — TELEPHONE ENCOUNTER
Informed patient of persistently low WBC, neutropenia. Will refer to hematology for further work-up. Patient verbalized understanding.

## 2019-05-14 NOTE — TELEPHONE ENCOUNTER
Pt called in and was wanting to speak with Dr. Chandana Prado in regards to the blood results that she got redone on Monday. Please advise.

## 2019-05-15 LAB
ANA TITR SER IF: NEGATIVE {TITER}
COPPER SERPL-MCNC: 115 UG/DL (ref 72–166)
HIV 1+2 AB+HIV1 P24 AG SERPL QL IA: NONREACTIVE
HIV12 RESULT COMMENT, HHIVC: NORMAL

## 2019-06-10 ENCOUNTER — OFFICE VISIT (OUTPATIENT)
Dept: ONCOLOGY | Age: 54
End: 2019-06-10

## 2019-06-10 ENCOUNTER — HOSPITAL ENCOUNTER (OUTPATIENT)
Dept: INFUSION THERAPY | Age: 54
Discharge: HOME | End: 2019-06-10
Payer: COMMERCIAL

## 2019-06-10 VITALS
BODY MASS INDEX: 38.23 KG/M2 | HEART RATE: 75 BPM | DIASTOLIC BLOOD PRESSURE: 86 MMHG | WEIGHT: 209 LBS | TEMPERATURE: 97.1 F | RESPIRATION RATE: 18 BRPM | SYSTOLIC BLOOD PRESSURE: 135 MMHG | OXYGEN SATURATION: 100 %

## 2019-06-10 VITALS
WEIGHT: 209 LBS | OXYGEN SATURATION: 100 % | HEART RATE: 75 BPM | DIASTOLIC BLOOD PRESSURE: 86 MMHG | SYSTOLIC BLOOD PRESSURE: 135 MMHG | BODY MASS INDEX: 38.23 KG/M2 | TEMPERATURE: 97.1 F

## 2019-06-10 DIAGNOSIS — D70.9 NEUTROPENIA, UNSPECIFIED TYPE (HCC): ICD-10-CM

## 2019-06-10 DIAGNOSIS — D70.9 NEUTROPENIA, UNSPECIFIED TYPE (HCC): Primary | ICD-10-CM

## 2019-06-10 PROCEDURE — 36415 COLL VENOUS BLD VENIPUNCTURE: CPT

## 2019-06-10 RX ORDER — ERGOCALCIFEROL 1.25 MG/1
50000 CAPSULE ORAL
Qty: 8 CAP | Refills: 0 | Status: SHIPPED | OUTPATIENT
Start: 2019-06-10 | End: 2020-01-02 | Stop reason: ALTCHOICE

## 2019-06-10 NOTE — H&P (VIEW-ONLY)
[facility] [address] [address], [address] [facility], Replaced by [facility] Office Phone: [phone] Fax: [phone] NEW HEME/ONC CONSULT Reason for visit:  New Patient (neutropenia ) HPI:   Ashok Guy is a 53 y.o.  female who I was asked to see in consultation at the request of Dr. Radha Cadena for evaluation for Neutropenia/leukopenia. Patient was seen and examined. She has been having neutropenia at least since 2016 as per available records with no repeated infections, or impaired wound healing. She is very active and previous w/u from PCP including NEGRO, HIV, TSH, RA, anti-CCP,  was negative. She is on lisinopril which can lead to neutropenia. She denies any fever, chills, NV, or repeated infections. She is very active with ECOG=0 and independent with ADLs and IADLs. DX:Neutropenia HEMATOLOGY HISTORY:  
 
1/12/16: ANC=1.5 
 
3/16/17: ANC=1.1 
 
10/20/17: ANC=1.3 
 
5/08/19: copper=115, NEGRO=neg, B12 and folate wnl., HIV negative, WBC=2.6, H/H 12.1/36.1, MCV=85, Eogx=783, ANC=0.9 (1.8-8.0) Vit D 15. 5- TSH wnl 
 
6/10/19: First Hematology visit with me Past Medical History:  
Diagnosis Date  Back pain  Chest pain  Hypertension  Sickle cell trait ([address] Past Surgical History:  
Procedure Laterality Date  HX TUBAL LIGATION Social History Socioeconomic History  Marital status: SINGLE Spouse name: Not on file  Number of children: Not on file  Years of education: Not on file  Highest education level: Not on file Occupational History Comment: [employer] Tobacco Use  Smoking status: Never Smoker  Smokeless tobacco: Never Used Substance and Sexual Activity  Alcohol use: Yes Alcohol/week: 0.0 oz  
  Comment: one glass of wine occasionally  Drug use: No  
 Sexual activity: Never Family History Problem Relation Age of Onset  Heart Disease Father 61  Breast Cancer Other  Diabetes Mother  Hypertension Mother  Hypertension Sister  Diabetes Sister  Cancer Paternal Grandfather  Cancer Brother  Hypertension Brother Current Outpatient Medications Medication Sig Dispense Refill  lisinopril (PRINIVIL, ZESTRIL) 10 mg tablet Take 1 Tab by mouth daily. 90 Tab 3  ibuprofen (MOTRIN) 800 mg tablet Take 1 Tab by mouth every eight (8) hours as needed for Pain. 100 Tab 0  cholecalciferol (VITAMIN D3) 1,000 unit tablet Take 2 Tabs by mouth daily. 90 Tab 3 Allergies Allergen Reactions  Shellfish Derived Hives Review of Systems All 12 points of ROS negative excepts right shoulder pain (She hurt it yesterday). No fever, no repeated infection, no problem with wounds healing. Clinically very well. ECOG=0 Objective: 
Physical Exam: 
Visit Vitals /86 (BP 1 Location: Left arm, BP Patient Position: Sitting) Pulse 75 Temp 97.1 °F (36.2 °C) (Oral) Resp 18 Wt 94.8 kg (209 lb) SpO2 100% BMI 38.23 kg/m² General:  Alert, cooperative, no distress, appears stated age. Head:  Normocephalic, without obvious abnormality, atraumatic. Eyes:  Conjunctivae/corneas clear. PERRL, EOMs intact. Throat: Lips, mucosa, and tongue normal.   
Neck: Supple, symmetrical, trachea midline, no adenopathy, thyroid: no enlargement/tenderness/nodules Back:   Symmetric, no curvature. ROM normal. No CVA tenderness. Lungs:   Clear to auscultation bilaterally. Chest wall:  No tenderness or deformity. Heart:  Regular rate and rhythm, S1, S2 normal, no murmur, click, rub or gallop. Abdomen:   Soft, non-tender. Bowel sounds normal. No masses,  No organomegaly. Extremities: Extremities normal, atraumatic, no cyanosis or edema. Skin: Skin color, texture, turgor normal. No rashes or lesions. Lymph nodes: Cervical, supraclavicular, and axillary nodes normal.  
Neurologic: CNII-XII intact. Diagnostic Imaging No results found for this or any previous visit. Lab Results Lab Results Component Value Date/Time WBC 2.6 (L) 05/13/2019 10:13 AM  
 HGB 12.1 05/13/2019 10:13 AM  
 HCT 36.6 05/13/2019 10:13 AM  
 PLATELET 466 78/60/5551 10:13 AM  
 MCV 85.5 05/13/2019 10:13 AM  
 
 
Lab Results Component Value Date/Time Sodium 138 05/08/2019 09:53 AM  
 Potassium 4.0 05/08/2019 09:53 AM  
 Chloride 106 05/08/2019 09:53 AM  
 CO2 27 05/08/2019 09:53 AM  
 Anion gap 5 05/08/2019 09:53 AM  
 Glucose 74 05/08/2019 09:53 AM  
 BUN 12 05/08/2019 09:53 AM  
 Creatinine 0.69 05/08/2019 09:53 AM  
 BUN/Creatinine ratio 17 05/08/2019 09:53 AM  
 GFR est AA >60 05/08/2019 09:53 AM  
 GFR est non-AA >60 05/08/2019 09:53 AM  
 Calcium 8.4 (L) 05/08/2019 09:53 AM  
 AST (SGOT) 14 (L) 05/08/2019 09:53 AM  
 Alk. phosphatase 103 05/08/2019 09:53 AM  
 Protein, total 7.3 05/08/2019 09:53 AM  
 Albumin 3.4 05/08/2019 09:53 AM  
 Globulin 3.9 05/08/2019 09:53 AM  
 A-G Ratio 0.9 05/08/2019 09:53 AM  
 ALT (SGPT) 20 05/08/2019 09:53 AM  
F/U with PCP for health maintenance Assessment/Plan: 
53 y.o. female with 1. Leukopenia/Neutropenia: 
I had a long discussion with Ms. Yao Robledo. She has no repeated infections or difficulties healing. Previous work-up from PCP is negative. 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 Pathology review of PBS as well as flow cytometry also ordered. Of note, patient is on lisinopril which can also lead to neutropenia. If possible please switch to another blood pressure medication. 2. Vitamin D deficiency: replace with 50,000 units weekly for 8 weeks Thank you Dr. Rogelio Jules for referring Ms. Geiger to me. Return in 3 weeks

## 2019-06-10 NOTE — Clinical Note
Dear Sydney Montalvo you for referring Mrs. Lillie Hoang. Although she likely have benign ethnic neutropenia, I will send her for bone marrow biopsy to r/o any marrow pb.  Verna Rosario

## 2019-06-10 NOTE — PROGRESS NOTES
[facility] OPI Progress Note    Date: Peggy 10, 2019    Name: Abril York    MRN: [id number]         : 1965    Peripheral Lab Draw      Ms. Geiger to [facility], ambulatory at  accompanied by self. Pt was assessed and education was provided. Ms. Geiger's vitals were reviewed and patient was observed for 5 minutes prior to treatment. Visit Vitals  /86 (BP 1 Location: Left arm, BP Patient Position: Sitting)   Pulse 75   Temp 97.1 °F (36.2 °C) (Oral)   Wt 94.8 kg (209 lb)   SpO2 100%   BMI 38.23 kg/m²       Blood obtained peripherally from left arm x 1 attempt with butterfly needle and sent to lab for Pathologist Reveiw per written orders. No bleeding or hematoma noted at site. Gauze and coban applied. Ms. Chanda Armstrong tolerated the phlebotomy, and had no complaints. Patient armband removed and shredded. Ms. Chanda Armstrong was discharged from [place] [id number] in stable condition at 1315.      Latrell Aguero Phlebotomist PCT   Peggy 10, 2019  2:52 PM

## 2019-06-10 NOTE — PROGRESS NOTES
[facility]  [id number] [place] [address], [address]  [place], [facility]  Office Phone: [phone]  Fax: [phone]      Reason for visit:  New Patient (neutropenia )      HPI:   Elzbieta Swanson is a 53 y.o.  female who I was asked to see in consultation at the request of Dr. Santosh Breen for evaluation for Neutropenia/leukopenia. Patient was seen and examined. She has been having neutropenia at least since 2016 as per available records with no repeated infections, or impaired wound healing. She is very active and previous w/u from PCP including NEGRO, HIV, TSH, RA, anti-CCP,  was negative. She is on lisinopril which can lead to neutropenia. She denies any fever, chills, NV, or repeated infections. She is very active with ECOG=0 and independent with ADLs and IADLs. DX:Neutropenia      HEMATOLOGY HISTORY:     1/12/16: ANC=1.5    3/16/17: ANC=1.1    10/20/17: ANC=1.3    5/08/19: copper=115, NEGRO=neg, B12 and folate wnl., HIV negative, WBC=2.6, H/H 12.1/36.1, MCV=85, Xcpg=373, ANC=0.9 (1.8-8.0)  Vit D 15. 5- TSH wnl    6/10/19: First Hematology visit with me        Past Medical History:   Diagnosis Date    Back pain     Chest pain     Hypertension     Sickle cell trait ([address]      Past Surgical History:   Procedure Laterality Date    HX TUBAL LIGATION       Social History     Socioeconomic History    Marital status: SINGLE     Spouse name: Not on file    Number of children: Not on file    Years of education: Not on file    Highest education level: Not on file   Occupational History     Comment: [employer]   Tobacco Use    Smoking status: Never Smoker    Smokeless tobacco: Never Used   Substance and Sexual Activity    Alcohol use:  Yes     Alcohol/week: 0.0 oz     Comment: one glass of wine occasionally    Drug use: No    Sexual activity: Never     Family History   Problem Relation Age of Onset    Heart Disease Father 61    Breast Cancer Other     Diabetes Mother     Hypertension Mother     Hypertension Sister     Diabetes Sister     Cancer Paternal Grandfather     Cancer Brother     Hypertension Brother        Current Outpatient Medications   Medication Sig Dispense Refill    lisinopril (PRINIVIL, ZESTRIL) 10 mg tablet Take 1 Tab by mouth daily. 90 Tab 3    ibuprofen (MOTRIN) 800 mg tablet Take 1 Tab by mouth every eight (8) hours as needed for Pain. 100 Tab 0    cholecalciferol (VITAMIN D3) 1,000 unit tablet Take 2 Tabs by mouth daily. 90 Tab 3       Allergies   Allergen Reactions    Shellfish Derived Hives       Review of Systems  All 12 points of ROS negative excepts right shoulder pain (She hurt it yesterday). No fever, no repeated infection, no problem with wounds healing. Clinically very well. ECOG=0    Objective:  Physical Exam:  Visit Vitals  /86 (BP 1 Location: Left arm, BP Patient Position: Sitting)   Pulse 75   Temp 97.1 °F (36.2 °C) (Oral)   Resp 18   Wt 94.8 kg (209 lb)   SpO2 100%   BMI 38.23 kg/m²       General:  Alert, cooperative, no distress, appears stated age. Head:  Normocephalic, without obvious abnormality, atraumatic. Eyes:  Conjunctivae/corneas clear. PERRL, EOMs intact. Throat: Lips, mucosa, and tongue normal.    Neck: Supple, symmetrical, trachea midline, no adenopathy, thyroid: no enlargement/tenderness/nodules   Back:   Symmetric, no curvature. ROM normal. No CVA tenderness. Lungs:   Clear to auscultation bilaterally. Chest wall:  No tenderness or deformity. Heart:  Regular rate and rhythm, S1, S2 normal, no murmur, click, rub or gallop. Abdomen:   Soft, non-tender. Bowel sounds normal. No masses,  No organomegaly. Extremities: Extremities normal, atraumatic, no cyanosis or edema. Skin: Skin color, texture, turgor normal. No rashes or lesions. Lymph nodes: Cervical, supraclavicular, and axillary nodes normal.   Neurologic: CNII-XII intact.        Diagnostic Imaging     No results found for this or any previous visit. Lab Results  Lab Results   Component Value Date/Time    WBC 2.6 (L) 05/13/2019 10:13 AM    HGB 12.1 05/13/2019 10:13 AM    HCT 36.6 05/13/2019 10:13 AM    PLATELET 668 98/12/4202 10:13 AM    MCV 85.5 05/13/2019 10:13 AM       Lab Results   Component Value Date/Time    Sodium 138 05/08/2019 09:53 AM    Potassium 4.0 05/08/2019 09:53 AM    Chloride 106 05/08/2019 09:53 AM    CO2 27 05/08/2019 09:53 AM    Anion gap 5 05/08/2019 09:53 AM    Glucose 74 05/08/2019 09:53 AM    BUN 12 05/08/2019 09:53 AM    Creatinine 0.69 05/08/2019 09:53 AM    BUN/Creatinine ratio 17 05/08/2019 09:53 AM    GFR est AA >60 05/08/2019 09:53 AM    GFR est non-AA >60 05/08/2019 09:53 AM    Calcium 8.4 (L) 05/08/2019 09:53 AM    AST (SGOT) 14 (L) 05/08/2019 09:53 AM    Alk. phosphatase 103 05/08/2019 09:53 AM    Protein, total 7.3 05/08/2019 09:53 AM    Albumin 3.4 05/08/2019 09:53 AM    Globulin 3.9 05/08/2019 09:53 AM    A-G Ratio 0.9 05/08/2019 09:53 AM    ALT (SGPT) 20 05/08/2019 09:53 AM   F/U with PCP for health maintenance    Assessment/Plan:  53 y.o. female with   1. Leukopenia/Neutropenia:  I had a long discussion with Ms. Beau Matthews. She has no repeated infections or difficulties healing. Previous work-up from PCP is negative. 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 Pathology review of PBS as well as flow cytometry also ordered. Of note, patient is on lisinopril which can also lead to neutropenia. If possible please switch to another blood pressure medication. 2. Vitamin D deficiency: replace with 50,000 units weekly for 8 weeks    Thank you Dr. David Adkins for referring Ms. Geiger to me.       Return in 3 weeks

## 2019-06-11 LAB — PATH REV BLD -IMP: NORMAL

## 2019-06-17 DIAGNOSIS — D70.9 NEUTROPENIA (HCC): Primary | ICD-10-CM

## 2019-06-17 RX ORDER — SODIUM CHLORIDE 9 MG/ML
20 INJECTION, SOLUTION INTRAVENOUS CONTINUOUS
Status: CANCELLED | OUTPATIENT
Start: 2019-06-17

## 2019-06-18 ENCOUNTER — HOSPITAL ENCOUNTER (OUTPATIENT)
Dept: CT IMAGING | Age: 54
Discharge: HOME | End: 2019-06-18
Attending: RADIOLOGY | Admitting: RADIOLOGY
Payer: COMMERCIAL

## 2019-06-18 VITALS
RESPIRATION RATE: 18 BRPM | OXYGEN SATURATION: 100 % | SYSTOLIC BLOOD PRESSURE: 124 MMHG | BODY MASS INDEX: 38.57 KG/M2 | TEMPERATURE: 97.5 F | HEART RATE: 47 BPM | WEIGHT: 210.9 LBS | DIASTOLIC BLOOD PRESSURE: 88 MMHG

## 2019-06-18 DIAGNOSIS — D70.9 NEUTROPENIA, UNSPECIFIED TYPE (HCC): ICD-10-CM

## 2019-06-18 LAB
ANION GAP SERPL CALC-SCNC: 6 MMOL/L (ref 3–18)
APTT PPP: 28.1 SEC (ref 23–36.4)
BASOPHILS # BLD: 0 K/UL (ref 0–0.1)
BASOPHILS NFR BLD: 0 % (ref 0–3)
BLASTS NFR BLD MANUAL: 0 %
BUN SERPL-MCNC: 11 MG/DL (ref 7–18)
BUN/CREAT SERPL: 17 (ref 12–20)
CALCIUM SERPL-MCNC: 7.7 MG/DL (ref 8.5–10.1)
CHLORIDE SERPL-SCNC: 110 MMOL/L (ref 100–108)
CO2 SERPL-SCNC: 26 MMOL/L (ref 21–32)
CREAT SERPL-MCNC: 0.65 MG/DL (ref 0.6–1.3)
DIFFERENTIAL METHOD BLD: ABNORMAL
EOSINOPHIL # BLD: 0.1 K/UL (ref 0–0.4)
EOSINOPHIL NFR BLD: 2 % (ref 0–5)
ERYTHROCYTE [DISTWIDTH] IN BLOOD BY AUTOMATED COUNT: 12.8 % (ref 11.6–14.5)
GLUCOSE SERPL-MCNC: 99 MG/DL (ref 74–99)
HCT VFR BLD AUTO: 36.1 % (ref 35–45)
HGB BLD-MCNC: 11.8 G/DL (ref 12–16)
INR PPP: 1 (ref 0.8–1.2)
LYMPHOCYTES # BLD: 1.9 K/UL (ref 0.8–3.5)
LYMPHOCYTES NFR BLD: 52 % (ref 20–51)
MANUAL DIFFERENTIAL PERFORMED BLD QL: ABNORMAL
MCH RBC QN AUTO: 28.6 PG (ref 24–34)
MCHC RBC AUTO-ENTMCNC: 32.7 G/DL (ref 31–37)
MCV RBC AUTO: 87.4 FL (ref 74–97)
METAMYELOCYTES NFR BLD MANUAL: 0 %
MONOCYTES # BLD: 0.2 K/UL (ref 0–1)
MONOCYTES NFR BLD: 6 % (ref 2–9)
MYELOCYTES NFR BLD MANUAL: 0 %
NEUTS BAND NFR BLD MANUAL: 0 % (ref 0–5)
NEUTS SEG # BLD: 1.4 K/UL (ref 1.8–8)
NEUTS SEG NFR BLD: 40 % (ref 42–75)
PLATELET # BLD AUTO: 373 K/UL (ref 135–420)
PMV BLD AUTO: 8.6 FL (ref 9.2–11.8)
POTASSIUM SERPL-SCNC: 3.7 MMOL/L (ref 3.5–5.5)
PROMYELOCYTES NFR BLD MANUAL: 0 %
PROTHROMBIN TIME: 12.9 SEC (ref 11.5–15.2)
RBC # BLD AUTO: 4.13 M/UL (ref 4.2–5.3)
RBC MORPH BLD: ABNORMAL
SODIUM SERPL-SCNC: 142 MMOL/L (ref 136–145)
WBC # BLD AUTO: 3.6 K/UL (ref 4.6–13.2)

## 2019-06-18 PROCEDURE — 85027 COMPLETE CBC AUTOMATED: CPT

## 2019-06-18 PROCEDURE — 74011250636 HC RX REV CODE- 250/636: Performed by: RADIOLOGY

## 2019-06-18 PROCEDURE — 38221 DX BONE MARROW BIOPSIES: CPT

## 2019-06-18 PROCEDURE — 88184 FLOWCYTOMETRY/ TC 1 MARKER: CPT

## 2019-06-18 PROCEDURE — 88185 FLOWCYTOMETRY/TC ADD-ON: CPT

## 2019-06-18 PROCEDURE — 88237 TISSUE CULTURE BONE MARROW: CPT

## 2019-06-18 PROCEDURE — 88313 SPECIAL STAINS GROUP 2: CPT

## 2019-06-18 PROCEDURE — 88305 TISSUE EXAM BY PATHOLOGIST: CPT

## 2019-06-18 PROCEDURE — 88264 CHROMOSOME ANALYSIS 20-25: CPT

## 2019-06-18 PROCEDURE — 88311 DECALCIFY TISSUE: CPT

## 2019-06-18 PROCEDURE — 85730 THROMBOPLASTIN TIME PARTIAL: CPT

## 2019-06-18 PROCEDURE — 85610 PROTHROMBIN TIME: CPT

## 2019-06-18 PROCEDURE — 80048 BASIC METABOLIC PNL TOTAL CA: CPT

## 2019-06-18 RX ORDER — MIDAZOLAM HYDROCHLORIDE 1 MG/ML
.5-4 INJECTION, SOLUTION INTRAMUSCULAR; INTRAVENOUS
Status: DISCONTINUED | OUTPATIENT
Start: 2019-06-18 | End: 2019-06-18 | Stop reason: ALTCHOICE

## 2019-06-18 RX ORDER — HYDROCODONE BITARTRATE AND ACETAMINOPHEN 10; 325 MG/1; MG/1
1 TABLET ORAL
Status: DISCONTINUED | OUTPATIENT
Start: 2019-06-18 | End: 2019-06-18 | Stop reason: HOSPADM

## 2019-06-18 RX ORDER — LIDOCAINE HYDROCHLORIDE 10 MG/ML
1-20 INJECTION INFILTRATION; PERINEURAL
Status: DISCONTINUED | OUTPATIENT
Start: 2019-06-18 | End: 2019-06-18 | Stop reason: ALTCHOICE

## 2019-06-18 RX ORDER — FENTANYL CITRATE 50 UG/ML
25-200 INJECTION, SOLUTION INTRAMUSCULAR; INTRAVENOUS
Status: DISCONTINUED | OUTPATIENT
Start: 2019-06-18 | End: 2019-06-18 | Stop reason: ALTCHOICE

## 2019-06-18 RX ORDER — HEPARIN SODIUM 1000 [USP'U]/ML
5000 INJECTION, SOLUTION INTRAVENOUS; SUBCUTANEOUS
Status: COMPLETED | OUTPATIENT
Start: 2019-06-18 | End: 2019-06-18

## 2019-06-18 RX ORDER — SODIUM CHLORIDE 9 MG/ML
20 INJECTION, SOLUTION INTRAVENOUS CONTINUOUS
Status: DISCONTINUED | OUTPATIENT
Start: 2019-06-18 | End: 2019-06-18 | Stop reason: HOSPADM

## 2019-06-18 RX ADMIN — HEPARIN SODIUM 5000 UNITS: 1000 INJECTION, SOLUTION INTRAVENOUS; SUBCUTANEOUS at 11:49

## 2019-06-18 RX ADMIN — FENTANYL CITRATE 50 MCG: 50 INJECTION, SOLUTION INTRAMUSCULAR; INTRAVENOUS at 11:34

## 2019-06-18 RX ADMIN — FENTANYL CITRATE 25 MCG: 50 INJECTION, SOLUTION INTRAMUSCULAR; INTRAVENOUS at 11:39

## 2019-06-18 RX ADMIN — MIDAZOLAM 0.5 MG: 1 INJECTION INTRAMUSCULAR; INTRAVENOUS at 11:46

## 2019-06-18 RX ADMIN — LIDOCAINE HYDROCHLORIDE 10 ML: 10 INJECTION, SOLUTION INFILTRATION; PERINEURAL at 11:39

## 2019-06-18 RX ADMIN — FENTANYL CITRATE 25 MCG: 50 INJECTION, SOLUTION INTRAMUSCULAR; INTRAVENOUS at 11:45

## 2019-06-18 RX ADMIN — SODIUM CHLORIDE 20 ML/HR: 900 INJECTION, SOLUTION INTRAVENOUS at 10:01

## 2019-06-18 RX ADMIN — MIDAZOLAM 0.5 MG: 1 INJECTION INTRAMUSCULAR; INTRAVENOUS at 11:39

## 2019-06-18 RX ADMIN — FENTANYL CITRATE 25 MCG: 50 INJECTION, SOLUTION INTRAMUSCULAR; INTRAVENOUS at 11:43

## 2019-06-18 RX ADMIN — MIDAZOLAM 0.5 MG: 1 INJECTION INTRAMUSCULAR; INTRAVENOUS at 11:45

## 2019-06-18 RX ADMIN — MIDAZOLAM 1 MG: 1 INJECTION INTRAMUSCULAR; INTRAVENOUS at 11:34

## 2019-06-18 NOTE — INTERVAL H&P NOTE
VASCULAR & INTERVENTIONAL RADIOLOGY PROGRESS NOTE    History and Physical reviewed; I have examined the patient and there are no pertinent changes. Pt is an appropriate candidate to undergo BM bx under moderate sedation.     Updated Physical Exam:  Lungs: CTA  Heart: RRR, without murmur    Lee Ann Mcdaniel MD, MD  Vascular & Interventional Radiology  [facility]  6/18/2019

## 2019-06-18 NOTE — PROGRESS NOTES
Post procedure pause completed  All specimens to lab with Sandra  4x4 and tegaderm to cover incision site on left Iliac, no bleeding, oozing or bruising noted. TRANSFER - OUT REPORT:    Verbal report given to Sammie5 N Marta Moss RN(name) on [address]  being transferred to [facility](unit) for routine progression of care       Report consisted of patients Situation, Background, Assessment and   Recommendations(SBAR). Information from the following report(s) SBAR, Procedure Summary, Intake/Output and MAR was reviewed with the receiving nurse. Lines:   Peripheral IV 06/18/19 Left Hand (Active)   Site Assessment Clean, dry, & intact 6/18/2019 10:00 AM   Phlebitis Assessment 0 6/18/2019 10:00 AM   Infiltration Assessment 0 6/18/2019 10:00 AM   Dressing Status Clean, dry, & intact 6/18/2019 10:00 AM   Dressing Type Transparent;Tape 6/18/2019 10:00 AM   Hub Color/Line Status Pink; Infusing 6/18/2019 10:00 AM   Alcohol Cap Used Yes 6/18/2019 10:00 AM        Opportunity for questions and clarification was provided.       Patient transported with:   [place]

## 2019-06-18 NOTE — PROGRESS NOTES
Pt arrived to CT for scheduled bone marrow biopsy with moderate sedation. Alert and oriented x 4. Pt is cooperative, consent reviewed and signed with Dr. Latasha Edwards. Pt assessed by Teodora Ramachandran RN to find: skin warm and dry, Heart and Lung sounds WNL. Pt moved to CT table in prone position with cardiac monitoring in place, safety strap used.

## 2019-06-18 NOTE — PERIOP NOTES
Phase 2 Recovery Summary  Patient arrived to Phase 2 at 1210  Report received from [facility] TEGAN BAKER RN  Vitals:    06/18/19 1215 06/18/19 1230 06/18/19 1245 06/18/19 1300   BP: 113/73 116/75 121/79 123/76   Pulse:       Resp:       Temp:       SpO2: 97% 100% 100% 100%   Weight:           oriented to time, place, person and situation    Lines and Drains  Peripheral Intravenous Line:   Peripheral IV 06/18/19 Left Hand (Active)   Site Assessment Clean, dry, & intact 6/18/2019 12:15 PM   Phlebitis Assessment 0 6/18/2019 12:15 PM   Infiltration Assessment 0 6/18/2019 12:15 PM   Dressing Status Clean, dry, & intact 6/18/2019 12:15 PM   Dressing Type Tape;Transparent 6/18/2019 12:15 PM   Hub Color/Line Status Pink; Infusing 6/18/2019 12:15 PM   Alcohol Cap Used Yes 6/18/2019 10:00 AM      Patient arrived to phase 2 from PACU. Pt denies pain, N/V or dizziness. Vital signs taken and placed on continuous vitals. 1300- Checked on patient. Assessed dressing site. Clean, dry and intact. 1345- IV removed. Pt allowed to get dressed. 1400- Reassessed dressing site. Clean, dry and intact. Discharge instructions reviewed with pt and sister. Sister signed for discharge. Pt brought down via wheelchair.       Patient discharged to home with Karina Bangura  at [address] [place] [address]

## 2019-06-18 NOTE — PERIOP NOTES
Pre-Op Summary    Pt arrived via car with family/friend and is oriented to time, place, person and situation. Patient with steady gait with none assistive devices. Visit Vitals  /81 (BP 1 Location: Left arm, BP Patient Position: At rest)   Pulse (!) 56   Temp 97.5 °F (36.4 °C)   Resp 18   Wt 95.7 kg (210 lb 14.4 oz)   SpO2 100%   BMI 38.57 kg/m²       Peripheral IV located on Left hand . Patients belongings are located with daughter. Patient's point of contact is Tanvi Brendenbasil, leydi and their contact number is: [phone]. They will be in the waiting room. They are able to receive medication information. They will be their ride home.

## 2019-06-18 NOTE — DISCHARGE INSTRUCTIONS
Patient Education        Bone Marrow Aspiration and Biopsy: What to Expect at Home  Your Recovery  The biopsy site may feel sore for several days. It can help to walk, take pain medicine, and put ice packs on the site. You will probably be able to return to work and your usual activities the day after the procedure. Your doctor or nurse will call you with the results of your test.  This care sheet gives you a general idea about how long it will take for you to recover. But each person recovers at a different pace. Follow the steps below to get better as quickly as possible. How can you care for yourself at home? Activity    · Rest when you feel tired. Getting enough sleep will help you recover.     · You may drive when you are no longer taking pain pills and can quickly move your foot from the gas pedal to the brake. You must also be able to sit comfortably for a long period of time, even if you do not plan to go far. You might get caught in traffic.     · Most people are able to return to work the day after the procedure. Medicines    · Your doctor will tell you if and when you can restart your medicines. He or she will also give you instructions about taking any new medicines.     · If you take blood thinners, such as warfarin (Coumadin), clopidogrel (Plavix), or aspirin, be sure to talk to your doctor. He or she will tell you if and when to start taking those medicines again. Make sure that you understand exactly what your doctor wants you to do.     · Be safe with medicines. Take pain medicines exactly as directed. ? If the doctor gave you a prescription medicine for pain, take it as prescribed. ? If you are not taking a prescription pain medicine, take an over-the-counter medicine such as acetaminophen (Tylenol), ibuprofen (Advil, Motrin), or naproxen (Aleve). Read and follow all instructions on the label. ? Do not take two or more pain medicines at the same time unless the doctor told you to.  Many pain medicines have acetaminophen, which is Tylenol. Too much acetaminophen (Tylenol) can be harmful.     · If you think your pain medicine is making you sick to your stomach:  ? Take your medicine after meals (unless your doctor has told you not to). ? Ask your doctor for a different pain medicine.     · If your doctor prescribed antibiotics, take them as directed. Do not stop taking them just because you feel better.    Ice    · Put ice or a cold pack on the biopsy site for 10 to 20 minutes at a time. Put a thin cloth between the ice and your skin. Follow-up care is a key part of your treatment and safety. Be sure to make and go to all appointments, and call your doctor if you are having problems. It's also a good idea to know your test results and keep a list of the medicines you take. When should you call for help? Call 911 anytime you think you may need emergency care. For example, call if:    · You passed out (lost consciousness).    Call your doctor now or seek immediate medical care if:    · You have signs of infection, such as:  ? Increased pain, swelling, warmth, or redness. ? Red streaks leading from the biopsy site. ? Pus draining from the biopsy site. ? Swollen lymph nodes in your neck, armpits, or groin. ? A fever.    Watch closely for any changes in your health, and be sure to contact your doctor if:    · You are not getting better as expected. Where can you learn more? Go to http://oli-anne.info/. Enter E148 in the search box to learn more about \"Bone Marrow Aspiration and Biopsy: What to Expect at Home. \"  Current as of: June 25, 2018  Content Version: 11.9  © 8498-0104 [employer]. Care instructions adapted under license by [employer] (which disclaims liability or warranty for this information).  If you have questions about a medical condition or this instruction, always ask your healthcare professional. [address] any warranty or liability for your use of this information. DISCHARGE SUMMARY from Nurse    PATIENT INSTRUCTIONS:    After general anesthesia or intravenous sedation, for 24 hours or while taking prescription Narcotics:  · Limit your activities  · Do not drive and operate hazardous machinery  · Do not make important personal or business decisions  · Do  not drink alcoholic beverages  · If you have not urinated within 8 hours after discharge, please contact your surgeon on call. Report the following to your surgeon:  · Excessive pain, swelling, redness or odor of or around the surgical area  · Temperature over 100.5  · Nausea and vomiting lasting longer than 4 hours or if unable to take medications  · Any signs of decreased circulation or nerve impairment to extremity: change in color, persistent  numbness, tingling, coldness or increase pain  · Any questions    What to do at Home:  Recommended activity: Activity as tolerated and no driving for today    These are general instructions for a healthy lifestyle:    No smoking/ No tobacco products/ Avoid exposure to second hand smoke  Surgeon General's Warning:  Quitting smoking now greatly reduces serious risk to your health. Obesity, smoking, and sedentary lifestyle greatly increases your risk for illness    A healthy diet, regular physical exercise & weight monitoring are important for maintaining a healthy lifestyle    You may be retaining fluid if you have a history of heart failure or if you experience any of the following symptoms:  Weight gain of 3 pounds or more overnight or 5 pounds in a week, increased swelling in our hands or feet or shortness of breath while lying flat in bed. Please call your doctor as soon as you notice any of these symptoms; do not wait until your next office visit. The discharge information has been reviewed with the patient. The patient verbalized understanding.   Discharge medications reviewed with the patient and appropriate educational materials and side effects teaching were provided. Patient armband removed and given to patient to take home. Patient was informed of the privacy risks if armband lost or stolen.

## 2019-06-18 NOTE — PROGRESS NOTES
Pt denies pain. VS stable. Placed on 2 lpm via NC. Called Hematology tech to come to CT, spoke with Merlinda Juba.

## 2019-07-25 DIAGNOSIS — I10 ESSENTIAL HYPERTENSION: Chronic | ICD-10-CM

## 2019-07-29 RX ORDER — LISINOPRIL 10 MG/1
10 TABLET ORAL DAILY
Qty: 90 TAB | Refills: 3 | Status: SHIPPED | OUTPATIENT
Start: 2019-07-29 | End: 2020-10-19 | Stop reason: SDUPTHER

## 2019-12-04 NOTE — PROGRESS NOTES
December 4, 2019       Charlie Hernandez MD  [address] [facility] [address]  VIA In Basket      Patient: Delfin Gray   YOB: 1936   Date of Visit: 12/4/2019       Dear Dr. Hernandez:    Thank you for referring Delfin Gray to me for evaluation. Below are my notes for this visit with her.    If you have questions, please do not hesitate to call me. I look forward to following your patient along with you.      Sincerely,        Abdirizak Covington MD        CC: No Recipients  Abdirizak Covington MD  12/4/2019  4:42 PM  Sign when Signing Visit  Patient: Delfin Gray  MRN: [id number]  YOB: 1936      History of Present Illness:  Delfin Gray is a 83 year old female here for a follow up consultation. Overall, she is doing well. She ambulates with a cane. She does have some muscle aches in her legs, but is walking well with her cane.     The patient denies any shortness of breath, chest pain, palpitations, or other cardiac complaints.       Current Outpatient Medications   Medication Sig Dispense Refill   • atorvastatin (LIPITOR) 40 MG tablet Take 1 tablet by mouth daily. 90 tablet 5   • acetaminophen (TYLENOL) 500 MG tablet Take 500 mg by mouth 2 times daily.     • diphenhydramine-acetaminophen (TYLENOL PM)  MG Tab Take 1 tablet by mouth nightly as needed.     • hydrochlorothiazide (HYDRODIURIL) 25 MG tablet Take 25 mg by mouth every morning.     • telmisartan (MICARDIS) 40 MG tablet Take 1 tablet by mouth daily. 90 tablet 3   • albuterol (VENTOLIN HFA) 108 (90 Base) MCG/ACT inhaler Inhale 2 puffs into the lungs every 4 hours as needed for Shortness of Breath.     • furosemide (LASIX) 20 MG tablet Take 1 tablet by mouth daily. 30 tablet 11   • clopidogrel (PLAVIX) 75 MG tablet Take 1 tablet by mouth daily. 30 tablet 11   • amLODIPine (NORVASC) 10 MG tablet Take 1 tablet by mouth daily. 90 tablet 1   • cholecalciferol (VITAMIN D3) 1000 UNITS tablet Take 1 tablet by mouth daily. 90 tablet 3   •  Laquita Dean is a 53 y.o. female presenting today for a new patient appointment r/t neutropenia. Patient is ambulatory with no assistive devices and denies any complaints. Chief Complaint   Patient presents with   [facility] Patient     neutropenia        Visit Vitals  /86 (BP 1 Location: Left arm, BP Patient Position: Sitting)   Pulse 75   Temp 97.1 °F (36.2 °C) (Oral)   Resp 18   Wt 94.8 kg (209 lb)   SpO2 100%   BMI 38.23 kg/m²       Current Outpatient Medications   Medication Sig    lisinopril (PRINIVIL, ZESTRIL) 10 mg tablet Take 1 Tab by mouth daily.  ibuprofen (MOTRIN) 800 mg tablet Take 1 Tab by mouth every eight (8) hours as needed for Pain.  cholecalciferol (VITAMIN D3) 1,000 unit tablet Take 2 Tabs by mouth daily. No current facility-administered medications for this visit. Medications no longer taking/discontinued:none    No flowsheet data found. 3 most recent PHQ Screens 5/8/2019   Little interest or pleasure in doing things Not at all   Feeling down, depressed, irritable, or hopeless Not at all   Total Score PHQ 2 0       Abuse Screening Questionnaire 2/18/2016   Do you ever feel afraid of your partner? N   Are you in a relationship with someone who physically or mentally threatens you? N   Is it safe for you to go home?  Y       Learning Assessment 3/16/2017   PRIMARY LEARNER Patient   HIGHEST LEVEL OF EDUCATION - PRIMARY LEARNER  TR[employer] SCHOOL   BARRIERS PRIMARY LEARNER NONE   CO-LEARNER CAREGIVER No   PRIMARY LANGUAGE ENGLISH   LEARNER PREFERENCE PRIMARY LISTENING     DEMONSTRATION     -     -     -   ANSWERED BY patient   RELATIONSHIP SELF gabapentin (NEURONTIN) 300 MG capsule Take 1 capsule by mouth daily. 90 capsule 3   • montelukast (SINGULAIR) 10 MG tablet Take 1 tablet by mouth nightly. 90 tablet 3   • aspirin 81 MG tablet Take 1 tablet by mouth daily. 90 tablet 3     No current facility-administered medications for this visit.        Review of Systems   Constitutional: Negative.    Respiratory: Negative.  Negative for shortness of breath.    Cardiovascular: Negative.  Negative for chest pain and leg swelling.   Musculoskeletal: Negative.    Neurological: Negative.    All other systems reviewed and are negative.    Review of Systems components negative unless otherwise noted in HPI.     /64 (BP Location: LUE - Left upper extremity, Patient Position: Sitting, Cuff Size: Regular)   Pulse 62   Ht 5' 5\" (1.651 m)   Wt 72.1 kg (159 lb)   BMI 26.46 kg/m²   BSA 1.79 m²      Physical Exam   Constitutional: She is oriented to person, place, and time. She appears well-developed and well-nourished.   Ambulates with a cane    HENT:   Head: Normocephalic and atraumatic.   Eyes: Pupils are equal, round, and reactive to light. Conjunctivae and EOM are normal.   Neck: Normal range of motion. Neck supple.   Cardiovascular: Normal rate and regular rhythm. Exam reveals no gallop and no friction rub.   No murmur heard.  Diminished pulses bilaterally without critical limb ischemia  -Mild ankle edema   Pulmonary/Chest: Effort normal and breath sounds normal.   Abdominal: Soft. Bowel sounds are normal. Musculoskeletal: Normal range of motion.     Neurological: She is alert and oriented to person, place, and time.   Prior stroke.  Difficult speech pattern.  Reliant on a cane   Skin: Skin is warm and dry.   Nursing note reviewed.      Lab Results   Component Value Date    SODIUM 141 11/21/2019    POTASSIUM 3.7 11/21/2019    CHLORIDE 106 11/21/2019    CO2 27 11/21/2019    BUN 38 (H) 11/21/2019    CREATININE 1.37 (H) 11/21/2019       Lab Results   Component  Value Date    CHOLESTEROL 147 11/21/2019    TRIGLYCERIDE 160 (H) 11/21/2019    HDL 41 (L) 11/21/2019    CALCLDL 74 11/21/2019    GPT 13 11/21/2019    AST 20 11/21/2019       Lab Results   Component Value Date    HGBA1C 6.1 (H) 11/21/2019    TSH 0.816 07/16/2019        Testing:  CTA BILATERAL LOWER EXTREMITIES WITH CONTRAST 05/03/2019  1.  Occlusions of bilateral superficial femoral arteries at or near their origins with reconstitution of distal bilateral superficial femoral arteries from collaterals.  2.  Probable two-vessel runoff to left ankle/foot from left anterior tibial and left peroneal arteries with probable occlusion of left posterior tibial artery at level of proximal left calf.  3.  Three-vessel runoff to right ankle/foot.  4.  Other findings as described above.      Transthoracic Echocardiogram 1/18/2019  1. Left ventricle: The cavity size is normal. Wall thickness is normal.  Systolic function is normal. The estimated ejection fraction is 55-60%,by biplane method of disks. Doppler parameters are consistent with abnormal left ventricular relaxation (grade 1 diastolic dysfunction).  2. Aortic valve: Trileaflet. Mild regurgitation.  3. Mitral valve: Structurally normal valve.  4. Right ventricle: Systolic function is low normal. RV systolic pressure(S, est): 43mm Hg.      Assessment and Plan:    Stage III chronic kidney disease (CMS/HCC)  -Continue to monitor       Intermittent claudication (CMS/HCC)  -She underwent right profundoplasty and common femoral artery drug-coated balloon angioplasty.  -Continue antiplatelet therapy as well as statin treatment  -She has fairly significant intra-inguinal disease but had significant improvement of the right leg following treatment of the inflow stenosis  -I reviewed her recent angiogram again.  The left iliac artery does not have significant obstructive disease  -Her activities are predominantly limited by arthritis and not by either thigh or calf claudication.  At  this point I do not think that she requires additional revascularization unless her symptoms worsen  -At times she has occasional diffuse body muscle aching.  I suggested a decrease in Lipitor to 40 mg and if this does not help her then consider the use of coenzyme Q 10 or changing to a different statin.    Chronic diastolic (congestive) heart failure (CMS/HCC)  -Continue Lasix  -Managed by Dr. Carpenter and Dr. Calvo    Essential hypertension  -BP is 122/64 today     Hyperlipidemia  -Decrease atorvastatin to 40 mg daily     Dysarthria as late effect of cerebrovascular accident (CVA)  -Her speech pattern is difficult to interpret at times      Scribe Attestation: Entered by Corwin Todd, acting as scribe for Dr. Covington.   Provider Attestation: The documentation recorded by the scribe accurately reflects the service I personally performed and the decisions made by me, Abdirizak Covington MD.

## 2020-01-02 ENCOUNTER — OFFICE VISIT (OUTPATIENT)
Dept: FAMILY MEDICINE CLINIC | Age: 55
End: 2020-01-02

## 2020-01-02 VITALS
BODY MASS INDEX: 38.67 KG/M2 | DIASTOLIC BLOOD PRESSURE: 80 MMHG | WEIGHT: 211.4 LBS | OXYGEN SATURATION: 97 % | RESPIRATION RATE: 18 BRPM | TEMPERATURE: 96.8 F | SYSTOLIC BLOOD PRESSURE: 124 MMHG | HEART RATE: 75 BPM

## 2020-01-02 DIAGNOSIS — G44.229 CHRONIC TENSION-TYPE HEADACHE, NOT INTRACTABLE: ICD-10-CM

## 2020-01-02 DIAGNOSIS — K59.04 CHRONIC IDIOPATHIC CONSTIPATION: ICD-10-CM

## 2020-01-02 DIAGNOSIS — I10 ESSENTIAL HYPERTENSION: Primary | Chronic | ICD-10-CM

## 2020-01-02 DIAGNOSIS — J06.9 VIRAL URI WITH COUGH: ICD-10-CM

## 2020-01-02 DIAGNOSIS — R10.30 LOWER ABDOMINAL PAIN: ICD-10-CM

## 2020-01-02 RX ORDER — PROMETHAZINE HYDROCHLORIDE AND CODEINE PHOSPHATE 6.25; 1 MG/5ML; MG/5ML
5-10 SOLUTION ORAL
Qty: 120 ML | Refills: 0 | Status: SHIPPED | OUTPATIENT
Start: 2020-01-02 | End: 2020-01-07

## 2020-01-02 RX ORDER — IBUPROFEN 800 MG/1
800 TABLET ORAL
Qty: 100 TAB | Refills: 0 | Status: SHIPPED | OUTPATIENT
Start: 2020-01-02

## 2020-01-02 NOTE — PROGRESS NOTES
History of Present Illness  Benito Khoury is a 53 y.o. female who presents today for management of    Chief Complaint   Patient presents with    Cough     patient has had a cough for about a week.  Abdominal Pain     rosario at night    Shoulder Pain       Upper Respiratory Infection  Patient complains of symptoms of a URI. Symptoms include cough. Onset of symptoms was 1 week ago, gradually worsening since that time. She also c/o non productive cough and wheezing for the past 1 week . She is drinking plenty of fluids. . Evaluation to date: none. Treatment to date: none. Abdominal Pain  Patient complains of abdominal pain. The pain is described as cramping and intermittent, and is 10/10 in intensity. Pain is located in the lower abdomen without radiation. Onset was 2 months ago. Symptoms have been gradually worsening since. Aggravating factors: occurs usually at night. Alleviating factors: NSAID's. Associated symptoms: constipation. The patient denies anorexia, belching, chills, diarrhea, dysuria, hematuria, melena, nausea, urinary frequency, urinary urgency and vomiting. Problem List  Patient Active Problem List    Diagnosis Date Noted    Other neutropenia ([address] 05/09/2019    Severe obesity (BMI 35.0-39. 9) with comorbidity ([address] 03/21/2018    Vitamin D deficiency 01/29/2016    Obesity 01/12/2016    Essential hypertension 01/12/2016    Joint stiffness 01/12/2016       Past Medical History  Past Medical History:   Diagnosis Date    Back pain     Chest pain     Hypertension     Sickle cell trait ([facility]         Surgical History  Past Surgical History:   Procedure Laterality Date    HX TUBAL LIGATION          Current Medications  Current Outpatient Medications   Medication Sig    ibuprofen (MOTRIN) 800 mg tablet Take 1 Tab by mouth every eight (8) hours as needed for Pain.     promethazine-codeine (PHENERGAN WITH CODEINE) 6.25-10 mg/5 mL syrup Take 5-10 mL by mouth every six (6) hours as needed for Cough for up to 5 days. Max Daily Amount: 40 mL.  lisinopril (PRINIVIL, ZESTRIL) 10 mg tablet Take 1 Tab by mouth daily.  cholecalciferol (VITAMIN D3) 1,000 unit tablet Take 2 Tabs by mouth daily. No current facility-administered medications for this visit. Allergies/Drug Reactions  Allergies   Allergen Reactions    Shellfish Derived Hives     States she has been able to eat shelfish with no allergic reaction        Family History  Family History   Problem Relation Age of Onset    Heart Disease Father 61    Breast Cancer Other     Diabetes Mother     Hypertension Mother     Hypertension Sister     Diabetes Sister     Cancer Paternal Grandfather     Cancer Brother     Hypertension Brother         Social History  Social History     Socioeconomic History    Marital status: SINGLE     Spouse name: Not on file    Number of children: Not on file    Years of education: Not on file    Highest education level: Not on file   Occupational History     Comment: [address] Financial resource strain: Not on file    Food insecurity:     Worry: Not on file     Inability: Not on file    Transportation needs:     Medical: Not on file     Non-medical: Not on file   Tobacco Use    Smoking status: Never Smoker    Smokeless tobacco: Never Used   Substance and Sexual Activity    Alcohol use:  Yes     Alcohol/week: 0.0 standard drinks     Comment: one glass of wine occasionally    Drug use: No    Sexual activity: Never   Lifestyle    Physical activity:     Days per week: Not on file     Minutes per session: Not on file    Stress: Not on file   Relationships    Social connections:     Talks on phone: Not on file     Gets together: Not on file     Attends Voodoo service: Not on file     Active member of club or organization: Not on file     Attends meetings of clubs or organizations: Not on file     Relationship status: Not on file    Intimate partner violence:     Fear of current or ex partner: Not on file     Emotionally abused: Not on file     Physically abused: Not on file     Forced sexual activity: Not on file   Other Topics Concern    Not on file   Social History Narrative    Not on file       Review of Systems  ROS      Physical Exam  Vital signs:   Vitals:    01/02/20 1324   BP: 124/80   Pulse: 75   Resp: 18   Temp: 96.8 °F (36 °C)   SpO2: 97%   Weight: 211 lb 6.4 oz (95.9 kg)       General: alert, oriented, not in distress  Eyes: clear conjunctivae, anicteric sclerae, full and equal ROMs  Chest/Lungs: clear breath sounds, no wheezing or crackles  Heart: normal rate, regular rhythm, no murmur  Abdomen: soft, non-distended, mild tenderness over hypogastric area, normal bowel sounds, no organomegaly, no masses  Extremities: no focal deformities, no edema  Neuro: AAOx3, CN's grossly intact  Skin: no visible abnormalities    Laboratory/Tests:  Component      Latest Ref Rng & Units 6/18/2019 6/18/2019          10:15 AM  9:55 AM   WBC      4.6 - 13.2 K/uL 3.6 (L)    RBC      4.20 - 5.30 M/uL 4.13 (L)    HGB      12.0 - 16.0 g/dL 11.8 (L)    HCT      35.0 - 45.0 % 36.1    MCV      74.0 - 97.0 FL 87.4    MCH      24.0 - 34.0 PG 28.6    MCHC      31.0 - 37.0 g/dL 32.7    RDW      11.6 - 14.5 % 12.8    PLATELET      751 - 631 K/uL 373    MPV      9.2 - 11.8 FL 8.6 (L)    NEUTROPHILS      42 - 75 % 40 (L)    BAND NEUTROPHILS      0 - 5 % 0    LYMPHOCYTES      20 - 51 % 52 (H)    MONOCYTES      2 - 9 % 6    EOSINOPHILS      0 - 5 % 2    BASOPHILS      0 - 3 % 0    METAMYELOCYTES      0 % 0    MYELOCYTES      0 % 0    PROMYELOCYTES      0 % 0    BLASTS      0 % 0    ABS. NEUTROPHILS      1.8 - 8.0 K/UL 1.4 (L)    ABS. LYMPHOCYTES      0.8 - 3.5 K/UL 1.9    ABS. MONOCYTES      0 - 1.0 K/UL 0.2    ABS. EOSINOPHILS      0.0 - 0.4 K/UL 0.1    ABS.  BASOPHILS      0.0 - 0.1 K/UL 0.0    RBC COMMENTS       NORMOCYTIC, NORMOCHROMIC    DF       MANUAL    DIFFERENTIAL MANUAL DIFFERENTIAL ORDERED    Sodium      136 - 145 mmol/L  142   Potassium      3.5 - 5.5 mmol/L  3.7   Chloride      100 - 108 mmol/L  110 (H)   CO2      21 - 32 mmol/L  26   Anion gap      3.0 - 18 mmol/L  6   Glucose      74 - 99 mg/dL  99   BUN      7.0 - 18 MG/DL  11   Creatinine      0.6 - 1.3 MG/DL  0.65   BUN/Creatinine ratio      12 - 20    17   GFR est AA      >60 ml/min/1.73m2  >60   GFR est non-AA      >60 ml/min/1.73m2  >60   Calcium      8.5 - 10.1 MG/DL  7.7 (L)   Prothrombin time      11.5 - 15.2 sec     INR      0.8 - 1.2         Component      Latest Ref Rng & Units 6/18/2019           9:55 AM   WBC      4.6 - 13.2 K/uL    RBC      4.20 - 5.30 M/uL    HGB      12.0 - 16.0 g/dL    HCT      35.0 - 45.0 %    MCV      74.0 - 97.0 FL    MCH      24.0 - 34.0 PG    MCHC      31.0 - 37.0 g/dL    RDW      11.6 - 14.5 %    PLATELET      500 - 655 K/uL    MPV      9.2 - 11.8 FL    NEUTROPHILS      42 - 75 %    BAND NEUTROPHILS      0 - 5 %    LYMPHOCYTES      20 - 51 %    MONOCYTES      2 - 9 %    EOSINOPHILS      0 - 5 %    BASOPHILS      0 - 3 %    METAMYELOCYTES      0 %    MYELOCYTES      0 %    PROMYELOCYTES      0 %    BLASTS      0 %    ABS. NEUTROPHILS      1.8 - 8.0 K/UL    ABS. LYMPHOCYTES      0.8 - 3.5 K/UL    ABS. MONOCYTES      0 - 1.0 K/UL    ABS. EOSINOPHILS      0.0 - 0.4 K/UL    ABS.  BASOPHILS      0.0 - 0.1 K/UL    RBC COMMENTS          DF          DIFFERENTIAL          Sodium      136 - 145 mmol/L    Potassium      3.5 - 5.5 mmol/L    Chloride      100 - 108 mmol/L    CO2      21 - 32 mmol/L    Anion gap      3.0 - 18 mmol/L    Glucose      74 - 99 mg/dL    BUN      7.0 - 18 MG/DL    Creatinine      0.6 - 1.3 MG/DL    BUN/Creatinine ratio      12 - 20      GFR est AA      >60 ml/min/1.73m2    GFR est non-AA      >60 ml/min/1.73m2    Calcium      8.5 - 10.1 MG/DL    Prothrombin time      11.5 - 15.2 sec 12.9   INR      0.8 - 1.2   1.0     Component      Latest Ref Rng & Units 5/13/2019 5/13/2019 5/13/2019          10:13 AM 10:13 AM 10:13 AM   HIV 1/2 Interpretation      NR    NONREACTIVE    HIV 1/2 result comment        SEE NOTE    Vitamin B12      211 - 911 pg/mL   663   Folate      3.10 - 17.50 ng/mL   14.2   Antinuclear Abs, IFA       NEGATIVE         Assessment/Plan:    1. Essential hypertension  - controlled    2. Chronic tension-type headache, not intractable  - ibuprofen (MOTRIN) 800 mg tablet; Take 1 Tab by mouth every eight (8) hours as needed for Pain. Dispense: 100 Tab; Refill: 0    3. Lower abdominal pain  - check XR of the abdomen    4. Chronic idiopathic constipation  - start stool softeners  - high fiber diet  - increase oral fluid intake  - XR ABD (AP AND ERECT OR DECUB); Future    5. Viral URI with cough  - promethazine-codeine (PHENERGAN WITH CODEINE) 6.25-10 mg/5 mL syrup; Take 5-10 mL by mouth every six (6) hours as needed for Cough for up to 5 days. Max Daily Amount: 40 mL. Dispense: 120 mL; Refill: 0        Follow-up and Dispositions    · Return in about 6 months (around 7/2/2020), or if symptoms worsen or fail to improve, for ROV. I have discussed the diagnosis with the patient and the intended plan as seen in the above orders. The patient has received an after-visit summary and questions were answered concerning future plans. I have discussed medication side effects and warnings with the patient as well. I have reviewed the plan of care with the patient, accepted their input and they are in agreement with the treatment goals.        Yessi Torres MD  January 2, 2020

## 2020-01-02 NOTE — PROGRESS NOTES
Lisa Couch is a 53 y.o. female who presents today for a cough and abdominal pain     1. Have you been to the ER, urgent care clinic since your last visit? Hospitalized since your last visit? no    2. Have you seen or consulted any other health care providers outside of the [address] [place] [address] since your last visit? Include any pap smears or colon screening. no     Health Maintenance reviewed.     Health Maintenance Due   Topic Date Due    Pneumococcal 0-64 years (1 of 3 - PCV13) 02/09/1970    DTaP/Tdap/Td series (1 - Tdap) 02/09/1975    Shingrix Vaccine Age 50> (1 of 2) 02/09/2014    PAP AKA CERVICAL CYTOLOGY  02/10/2019    Influenza Age 9 to Adult  08/01/2019    BREAST CANCER SCRN MAMMOGRAM  04/06/2020

## 2020-01-24 ENCOUNTER — TELEPHONE (OUTPATIENT)
Dept: FAMILY MEDICINE CLINIC | Age: 55
End: 2020-01-24

## 2020-01-24 NOTE — TELEPHONE ENCOUNTER
Pt. Stated promethazine-codeine (PHENERGAN WITH CODEINE) 6.25-10 mg/5 mL syrup is not working and she is still wheezing and have difficulty sleepiing at night. She is requesting another prescription.  Please assist.

## 2020-03-18 ENCOUNTER — TELEPHONE (OUTPATIENT)
Dept: FAMILY MEDICINE CLINIC | Age: 55
End: 2020-03-18

## 2020-03-18 NOTE — TELEPHONE ENCOUNTER
Nurse spoke with patient, advised [facility] to [facility] for questions or recommendations. Pt was given [id number]. Pt verbalized understanding, this encounter will be closed.

## 2020-03-18 NOTE — TELEPHONE ENCOUNTER
Patient works at [employer], and she has low white blood cell count. 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 She is wondering if she is at a greater risk. Please advise, thank you.

## 2020-03-20 ENCOUNTER — TELEPHONE (OUTPATIENT)
Dept: FAMILY MEDICINE CLINIC | Age: 55
End: 2020-03-20

## 2020-03-20 NOTE — TELEPHONE ENCOUNTER
Patient called in requesting a note for work. She stated that she has a compromised immune system and may need to be excused from work due to the [place] situation.  Please assist.

## 2020-12-30 DIAGNOSIS — I10 ESSENTIAL HYPERTENSION: Chronic | ICD-10-CM

## 2020-12-31 RX ORDER — LISINOPRIL 10 MG/1
TABLET ORAL
Qty: 30 TAB | Refills: 0 | Status: SHIPPED | OUTPATIENT
Start: 2020-12-31 | End: 2021-01-05 | Stop reason: SDUPTHER

## 2020-12-31 NOTE — TELEPHONE ENCOUNTER
Requested Prescriptions     Pending Prescriptions Disp Refills    lisinopriL (PRINIVIL, ZESTRIL) 10 mg tablet [Pharmacy Med Name: Lisinopril 10 MG Oral Tablet] 30 Tab 0     Sig: Take 1 tablet by mouth once daily

## 2020-12-31 NOTE — TELEPHONE ENCOUNTER
Pt called in and informed me that she is out of her medication and needs a refill as soon as possible as she has been out for a week. Please advise.

## 2021-01-05 DIAGNOSIS — I10 ESSENTIAL HYPERTENSION: Chronic | ICD-10-CM

## 2021-01-05 NOTE — TELEPHONE ENCOUNTER
Pt was last seen on 1/2/2020 next upcoming appointment on 1/20/2021. Pt was advised that Dr. Dave Garsia may or may not refill her prescription until she sees her. Pt verbalized understanding and tolerated it well.      Requested Prescriptions     Pending Prescriptions Disp Refills    lisinopriL (PRINIVIL, ZESTRIL) 10 mg tablet 30 Tab 0

## 2021-01-06 RX ORDER — LISINOPRIL 10 MG/1
TABLET ORAL
Qty: 30 TAB | Refills: 0 | Status: SHIPPED | OUTPATIENT
Start: 2021-01-06 | End: 2021-02-04 | Stop reason: SDUPTHER

## 2021-02-04 ENCOUNTER — OFFICE VISIT (OUTPATIENT)
Dept: FAMILY MEDICINE CLINIC | Age: 56
End: 2021-02-04
Payer: COMMERCIAL

## 2021-02-04 VITALS
SYSTOLIC BLOOD PRESSURE: 107 MMHG | DIASTOLIC BLOOD PRESSURE: 74 MMHG | TEMPERATURE: 97.2 F | HEART RATE: 68 BPM | OXYGEN SATURATION: 100 % | WEIGHT: 214 LBS | RESPIRATION RATE: 15 BRPM | HEIGHT: 62 IN | BODY MASS INDEX: 39.38 KG/M2

## 2021-02-04 DIAGNOSIS — R07.89 CHEST WALL PAIN, CHRONIC: ICD-10-CM

## 2021-02-04 DIAGNOSIS — E55.9 VITAMIN D DEFICIENCY: ICD-10-CM

## 2021-02-04 DIAGNOSIS — D70.0 CONGENITAL NEUTROPENIA (HCC): ICD-10-CM

## 2021-02-04 DIAGNOSIS — E66.01 SEVERE OBESITY (BMI 35.0-39.9) WITH COMORBIDITY (HCC): ICD-10-CM

## 2021-02-04 DIAGNOSIS — I10 ESSENTIAL HYPERTENSION: Primary | Chronic | ICD-10-CM

## 2021-02-04 DIAGNOSIS — G89.29 CHEST WALL PAIN, CHRONIC: ICD-10-CM

## 2021-02-04 PROCEDURE — 99214 OFFICE O/P EST MOD 30 MIN: CPT | Performed by: INTERNAL MEDICINE

## 2021-02-04 RX ORDER — LISINOPRIL 10 MG/1
TABLET ORAL
Qty: 90 TAB | Refills: 3 | Status: SHIPPED | OUTPATIENT
Start: 2021-02-04 | End: 2021-12-16 | Stop reason: SDUPTHER

## 2021-02-04 NOTE — PROGRESS NOTES
History of Present Illness  Rody Oleary is a 63 y.o. female who presents today for management of    Chief Complaint   Patient presents with    Hypertension       Chest Wall Pain  Patient presents for presents evaluation of chest wall pain. Onset was few months ago. Pain description: character of chest pain: sharp  location: anterior chest wall: midline  denies shortness of breath, hemoptysis and anginal type chest pain. Mechanism of injury: none known. Previous visits for this problem: none. Evaluation to date: none  Treatment to date: none    Cardiovascular Review:  The patient has hypertension and obesity. Diet and Lifestyle: generally follows a low fat low cholesterol diet, generally follows a low sodium diet, exercises sporadically, nonsmoker  Home BP Monitoring: is not measured at home. Pertinent ROS: taking medications as instructed, no medication side effects noted, no TIA's, no chest pain on exertion, no dyspnea on exertion, no swelling of ankles. Problem List  Patient Active Problem List    Diagnosis Date Noted    Other neutropenia ([facility] 05/09/2019    Severe obesity (BMI 35.0-39. 9) with comorbidity ([facility] 03/21/2018    Vitamin D deficiency 01/29/2016    Essential hypertension 01/12/2016    Joint stiffness 01/12/2016       Current Medications  Current Outpatient Medications   Medication Sig    lisinopriL (PRINIVIL, ZESTRIL) 10 mg tablet Take 1 tablet by mouth once daily    ibuprofen (MOTRIN) 800 mg tablet Take 1 Tab by mouth every eight (8) hours as needed for Pain.  cholecalciferol (VITAMIN D3) 1,000 unit tablet Take 2 Tabs by mouth daily. No current facility-administered medications for this visit.         Allergies/Drug Reactions  Allergies   Allergen Reactions    Shellfish Derived Hives     States she has been able to eat shelfish with no allergic reaction        Review of Systems  Review of Systems   Constitutional: Negative for chills, fever, malaise/fatigue and weight loss. Respiratory: Negative. Cardiovascular: Positive for chest pain. Negative for palpitations and leg swelling. Gastrointestinal: Negative. Neurological: Negative. Psychiatric/Behavioral: Negative. Physical Exam  Vital signs:   Vitals:    02/04/21 1106   BP: 107/74   Pulse: 68   Resp: 15   Temp: 97.2 °F (36.2 °C)   TempSrc: Temporal   SpO2: 100%   Weight: 214 lb (97.1 kg)   Height: 5' 2\" (1.575 m)       General: alert, oriented, not in distress  Head: scalp normal, atraumatic  Eyes: pupils are equal and reactive, full and intact EOM's  Neck: supple, no JVD, no lymphadenopathy, non-palpable thyroid  Chest/Lungs: clear breath sounds, no wheezing or crackles  Heart: normal rate, regular rhythm, no murmur  Extremities: no focal deformities, no edema  Skin: no active skin lesions    Assessment/Plan:    1. Chest wall pain, chronic  - suspect MSK    2. Essential hypertension  - controlled  - HEMOGLOBIN A1C WITH EAG; Future  - LIPID PANEL; Future  - METABOLIC PANEL, COMPREHENSIVE; Future  - TSH 3RD GENERATION; Future  - URINALYSIS W/ RFLX MICROSCOPIC; Future    3. Vitamin D deficiency  - VITAMIN D, 25 HYDROXY; Future    4. Congenital neutropenia (HCC)  - work-up unremarkable  - CBC WITH AUTOMATED DIFF; Future    5. Severe obesity (BMI 35.0-39. 9) with comorbidity ([address]  - Discussed the patient's BMI with her. The BMI follow up plan is as follows:   dietary management education, guidance, and counseling  encourage exercise  monitor weight    Follow-up and Dispositions    · Return in about 1 year (around 2/4/2022) for ROV. I have discussed the diagnosis with the patient and the intended plan as seen in the above orders. The patient has received an after-visit summary and questions were answered concerning future plans. I have discussed medication side effects and warnings with the patient as well.  I have reviewed the plan of care with the patient, accepted their input and they are in agreement with the treatment goals.        Gina Gan MD  February 4, 2021

## 2021-02-05 LAB
25(OH)D3 SERPL-MCNC: 18 NG/ML (ref 32–100)
A-G RATIO,AGRAT: 1.4 RATIO (ref 1.1–2.6)
ABSOLUTE LYMPHOCYTE COUNT, 10803: 2.2 K/UL (ref 1–4.8)
ALBUMIN SERPL-MCNC: 4.4 G/DL (ref 3.5–5)
ALP SERPL-CCNC: 102 U/L (ref 25–115)
ALT SERPL-CCNC: 18 U/L (ref 5–40)
ANION GAP SERPL CALC-SCNC: 7 MMOL/L (ref 3–15)
AST SERPL W P-5'-P-CCNC: 21 U/L (ref 10–37)
AVG GLU, 10930: 97 MG/DL (ref 91–123)
BASOPHILS # BLD: 0 K/UL (ref 0–0.2)
BASOPHILS NFR BLD: 1 % (ref 0–2)
BILIRUB SERPL-MCNC: 0.6 MG/DL (ref 0.2–1.2)
BILIRUB UR QL: NEGATIVE
BUN SERPL-MCNC: 12 MG/DL (ref 6–22)
CALCIUM SERPL-MCNC: 9.2 MG/DL (ref 8.4–10.5)
CHLORIDE SERPL-SCNC: 101 MMOL/L (ref 98–110)
CHOLEST SERPL-MCNC: 191 MG/DL (ref 110–200)
CLARITY: CLEAR
CO2 SERPL-SCNC: 29 MMOL/L (ref 20–32)
COLOR UR: YELLOW
CREAT SERPL-MCNC: 0.7 MG/DL (ref 0.5–1.2)
EOSINOPHIL # BLD: 0.1 K/UL (ref 0–0.5)
EOSINOPHIL NFR BLD: 1 % (ref 0–6)
EPITHELIAL,EPSU: NORMAL /HPF (ref 0–2)
ERYTHROCYTE [DISTWIDTH] IN BLOOD BY AUTOMATED COUNT: 13.1 % (ref 10–15.5)
GFRAA, 66117: >60
GFRNA, 66118: >60
GLOBULIN,GLOB: 3.1 G/DL (ref 2–4)
GLUCOSE SERPL-MCNC: 94 MG/DL (ref 70–99)
GLUCOSE UR QL: NEGATIVE MG/DL
GRANULOCYTES,GRANS: 36 % (ref 40–75)
HBA1C MFR BLD HPLC: 5 % (ref 4.8–5.6)
HCT VFR BLD AUTO: 41.1 % (ref 35.1–48)
HDLC SERPL-MCNC: 3.8 MG/DL (ref 0–5)
HDLC SERPL-MCNC: 50 MG/DL
HGB BLD-MCNC: 12.6 G/DL (ref 11.7–16)
HGB UR QL STRIP: NEGATIVE
KETONES UR QL STRIP.AUTO: NEGATIVE MG/DL
LDL/HDL RATIO,LDHD: 2.6
LDLC SERPL CALC-MCNC: 131 MG/DL (ref 50–99)
LEUKOCYTE ESTERASE: NEGATIVE
LYMPHOCYTES, LYMLT: 57 % (ref 20–45)
MCH RBC QN AUTO: 29 PG (ref 26–34)
MCHC RBC AUTO-ENTMCNC: 31 G/DL (ref 31–36)
MCV RBC AUTO: 93 FL (ref 81–99)
MONOCYTES # BLD: 0.2 K/UL (ref 0.1–1)
MONOCYTES NFR BLD: 5 % (ref 3–12)
NEUTROPHILS # BLD AUTO: 1.4 K/UL (ref 1.8–7.7)
NITRITE UR QL STRIP.AUTO: NEGATIVE
NON-HDL CHOLESTEROL, 011976: 141 MG/DL
PH UR STRIP: 6 PH (ref 5–8)
PLATELET # BLD AUTO: 490 K/UL (ref 140–440)
PMV BLD AUTO: 9.4 FL (ref 9–13)
POTASSIUM SERPL-SCNC: 4.6 MMOL/L (ref 3.5–5.5)
PROT SERPL-MCNC: 7.5 G/DL (ref 6.4–8.3)
PROT UR QL STRIP: NEGATIVE MG/DL
RBC # BLD AUTO: 4.42 M/UL (ref 3.8–5.2)
RBC #/AREA URNS HPF: NORMAL /HPF
SODIUM SERPL-SCNC: 137 MMOL/L (ref 133–145)
SP GR UR: 1.02 (ref 1–1.03)
TRIGL SERPL-MCNC: 53 MG/DL (ref 40–149)
TSH SERPL DL<=0.005 MIU/L-ACNC: 1.55 MCU/ML (ref 0.27–4.2)
URINE ASCORBIC ACID: NEGATIVE MG/DL
UROBILINOGEN UR STRIP-MCNC: <2 MG/DL
VLDLC SERPL CALC-MCNC: 11 MG/DL (ref 8–30)
WBC # BLD AUTO: 3.9 K/UL (ref 4–11)
WBC URNS QL MICRO: NORMAL /HPF (ref 0–2)

## 2021-02-08 ENCOUNTER — TELEPHONE (OUTPATIENT)
Dept: FAMILY MEDICINE CLINIC | Age: 56
End: 2021-02-08

## 2021-02-08 NOTE — TELEPHONE ENCOUNTER
----- Message from Jorge Haines MD sent at 2/8/2021 12:38 PM EST -----  Please advise patient that labs look good except for low vitamin D. Advise to take at least 2,000 units of vitamin D3 daily.

## 2021-02-08 NOTE — PROGRESS NOTES
Please advise patient that labs look good except for low vitamin D. Advise to take at least 2,000 units of vitamin D3 daily.

## 2021-12-16 ENCOUNTER — OFFICE VISIT (OUTPATIENT)
Dept: FAMILY MEDICINE CLINIC | Age: 56
End: 2021-12-16
Payer: COMMERCIAL

## 2021-12-16 VITALS
HEART RATE: 68 BPM | RESPIRATION RATE: 16 BRPM | HEIGHT: 62 IN | BODY MASS INDEX: 38.02 KG/M2 | DIASTOLIC BLOOD PRESSURE: 83 MMHG | TEMPERATURE: 97.2 F | WEIGHT: 206.6 LBS | SYSTOLIC BLOOD PRESSURE: 120 MMHG | OXYGEN SATURATION: 98 %

## 2021-12-16 DIAGNOSIS — N64.4 BREAST PAIN, LEFT: Primary | ICD-10-CM

## 2021-12-16 DIAGNOSIS — I10 ESSENTIAL HYPERTENSION: Chronic | ICD-10-CM

## 2021-12-16 DIAGNOSIS — M25.512 ACUTE PAIN OF LEFT SHOULDER: ICD-10-CM

## 2021-12-16 PROCEDURE — 99214 OFFICE O/P EST MOD 30 MIN: CPT | Performed by: INTERNAL MEDICINE

## 2021-12-16 RX ORDER — LISINOPRIL 10 MG/1
TABLET ORAL
Qty: 90 TABLET | Refills: 3 | Status: SHIPPED | OUTPATIENT
Start: 2021-12-16 | End: 2022-02-24 | Stop reason: SINTOL

## 2021-12-16 NOTE — PROGRESS NOTES
Chief Complaint   Patient presents with    Hypertension    Shoulder Pain     left shoulder 8/10 for 2 weeks    Breast Problem     sharp pain left breast sup/ext side      1. Have you been to the ER, urgent care clinic since your last visit? Hospitalized since your last visit? No    2. Have you seen or consulted any other health care providers outside of the [address] [place] [address] since your last visit? Include any pap smears or colon screening.  Yes [facility]      Health Maintenance Due   Topic Date Due    Pneumococcal 0-64 years (1 of 4 - PCV13) Never done    DTaP/Tdap/Td series (1 - Tdap) Never done    Shingrix Vaccine Age 50> (1 of 2) Never done    COVID-19 Vaccine (3 - Pfizer risk 4-dose series) 06/23/2021

## 2021-12-16 NOTE — PROGRESS NOTES
History of Present Illness  Keesha Henley is a 61 y.o. female who presents today for management of    Chief Complaint   Patient presents with    Hypertension    Shoulder Pain     left shoulder 8/10 for 2 weeks    Breast Problem     sharp pain left breast sup/ext side        Breast Pain  Patient comes into the office today for evaluation of a left breast pain   She initially felt the pain approximately few weeks ago. Pain is intermittent, sharp, lasting for afew seconds. she does deny skin changes, mass, nipple discharge, or other systemic symptoms. Last mammogram: 2/2021 - normal    Shoulder Pain  Patient complains of left shoulder pain. The symptoms began 1 month ago Course of symptoms since onset has been waxing and waning. Pain is described as worse at night. Symptoms were incited by repetitive activity. Therapy to date includes OTC analgesics: somewhat effective. Cardiovascular Review:  The patient has hypertension. Diet and Lifestyle: generally follows a low fat low cholesterol diet, generally follows a low sodium diet, sedentary  Home BP Monitoring: is not measured at home. Pertinent ROS: taking medications as instructed, no medication side effects noted, no TIA's, no chest pain on exertion, no dyspnea on exertion, no swelling of ankles. Problem List  Patient Active Problem List    Diagnosis Date Noted    Other neutropenia ([facility] 05/09/2019    Severe obesity (BMI 35.0-39. 9) with comorbidity ([facility] 03/21/2018    Vitamin D deficiency 01/29/2016    Essential hypertension 01/12/2016    Joint stiffness 01/12/2016       Current Medications  Current Outpatient Medications   Medication Sig    lisinopriL (PRINIVIL, ZESTRIL) 10 mg tablet Take 1 tablet by mouth once daily    ibuprofen (MOTRIN) 800 mg tablet Take 1 Tab by mouth every eight (8) hours as needed for Pain.  cholecalciferol (VITAMIN D3) 1,000 unit tablet Take 2 Tabs by mouth daily.      No current facility-administered medications for this visit. Allergies/Drug Reactions  Allergies   Allergen Reactions    Shellfish Derived Hives     States she has been able to eat shelfish with no allergic reaction. Patient declined 12/16/21        Review of Systems  Review of Systems   Constitutional: Negative. Respiratory: Negative. Cardiovascular: Negative. Gastrointestinal: Negative. Neurological: Negative.          Physical Exam  Vital signs:   Vitals:    12/16/21 1106   BP: 120/83   Pulse: 68   Resp: 16   Temp: 97.2 °F (36.2 °C)   TempSrc: Temporal   SpO2: 98%   Weight: 206 lb 9.6 oz (93.7 kg)   Height: 5' 2\" (1.575 m)       General: alert, oriented, not in distress  Head: scalp normal, atraumatic  Eyes: pupils are equal and reactive, full and intact EOM's  Neck: supple, no JVD, no lymphadenopathy, non-palpable thyroid  Breasts: right breast normal without mass, skin or nipple changes or axillary nodes, left breast normal without mass, skin or nipple changes or axillary nodes  Chest/Lungs: clear breath sounds, no wheezing or crackles  Heart: normal rate, regular rhythm, no murmur  Extremities: no focal deformities, no edema  Skin: no active skin lesions      Laboratory/Tests:  Lab Results   Component Value Date/Time    Cholesterol, total 191 02/04/2021 11:23 AM    HDL Cholesterol 50 02/04/2021 11:23 AM    LDL, calculated 131 (H) 02/04/2021 11:23 AM    Triglyceride 53 02/04/2021 11:23 AM    CHOL/HDL Ratio 3.4 05/08/2019 09:53 AM     Lab Results   Component Value Date/Time    TSH 1.55 02/04/2021 11:23 AM    T4, Free 1.0 05/08/2019 09:53 AM      Lab Results   Component Value Date/Time    Sodium 137 02/04/2021 11:23 AM    Potassium 4.6 02/04/2021 11:23 AM    Chloride 101 02/04/2021 11:23 AM    CO2 29 02/04/2021 11:23 AM    Anion gap 7.0 02/04/2021 11:23 AM    Glucose 94 02/04/2021 11:23 AM    BUN 12 02/04/2021 11:23 AM    Creatinine 0.7 02/04/2021 11:23 AM    BUN/Creatinine ratio 17 06/18/2019 09:55 AM    GFR est AA >60 06/18/2019 09:55 AM    GFR est non-AA >60 06/18/2019 09:55 AM    Calcium 9.2 02/04/2021 11:23 AM         Assessment/Plan:    1. Essential hypertension  -controlled  - lisinopriL (PRINIVIL, ZESTRIL) 10 mg tablet; Take 1 tablet by mouth once daily  Dispense: 90 Tablet; Refill: 3    2. Acute pain of left shoulder  - HEP  - pain control    3. Breast pain, left  - breast exam benign  - repeat mammogram in February 2022  - avoid ill-fitting bras      Follow-up and Dispositions    · Return in about 3 months (around 3/16/2022) for ROV. I have discussed the diagnosis with the patient and the intended plan as seen in the above orders. The patient has received an after-visit summary and questions were answered concerning future plans. I have discussed medication side effects and warnings with the patient as well. I have reviewed the plan of care with the patient, accepted their input and they are in agreement with the treatment goals.        Jesus Rosario MD  December 16, 2021

## 2021-12-29 LAB — SARS-COV-2, NAA: POSITIVE

## 2022-01-17 ENCOUNTER — NURSE TRIAGE (OUTPATIENT)
Dept: OTHER | Facility: CLINIC | Age: 57
End: 2022-01-17

## 2022-01-17 NOTE — TELEPHONE ENCOUNTER
Received call from oRb Cop[place] at [facility] with [employer] Complaint. Subjective: Caller states \"My lip was swelling\"     Takes lisinopril, stopped taking lisinopril one week ago when swelling began    Denies chest pain / shortness of breath /  Numbness / tingling / weakness / headache / blurred vision    Current Symptoms: Lip tingling, swelling to lips (gone now)    Denies difficulty swallowing or breathing    Onset: 1 week ago; sudden    Pain Severity: Denies pain    Temperature: Denies      What has been tried: benadryl      Recommended disposition: See PCP within 3 days. Care advice provided, patient verbalizes understanding; denies any other questions or concerns; instructed to call back for any new or worsening symptoms. Writer provided warm transfer to [place] at [facility] for appointment scheduling    Attention Provider: Thank you for allowing me to participate in the care of your patient. The patient was connected to triage in response to information provided to the ECC. Please do not respond through this encounter as the response is not directed to a shared pool.       Reason for Disposition   [1] Mild facial swelling (puffiness) AND [2] persists > 3 days    Protocols used: [facility]

## 2022-01-18 ENCOUNTER — TELEPHONE (OUTPATIENT)
Dept: FAMILY MEDICINE CLINIC | Age: 57
End: 2022-01-18

## 2022-01-18 NOTE — TELEPHONE ENCOUNTER
----- Message from Jaime Palacios sent at 1/17/2022  3:00 PM EST -----  Subject: Appointment Request    Reason for Call: Semi-Urgent Return from RN Triage    QUESTIONS  Type of Appointment? New Patient/New to Provider  Reason for appointment request? No appointments available during search  Additional Information for Provider? was patient with [place]-she needs   follow-up visit for BP meds-she was returned from [place] and was told to be   seen within 3 days. she is open anytime this week after 3-she was also   told she has an appt on 3/18 with Sharron Coburn but I do not see this on her   schedule   ---------------------------------------------------------------------------  --------------  CALL BACK INFO  What is the best way for the office to contact you? OK to leave message on   voicemail  Preferred Call Back Phone Number? [phone]  ---------------------------------------------------------------------------  --------------  SCRIPT ANSWERS  Patient needs to be seen within 3 days? Yes   Nurse Name? Nik  Have you been diagnosed with, awaiting test results for, or told that you   are suspected of having COVID-19 (Coronavirus)? (If patient has tested   negative or was tested as a requirement for work, school, or travel and   not based on symptoms, answer no)? Yes  Did your symptoms begin within the past 14 days or was your positive test   result within the past 14 days? No  Within the past two weeks have you developed any of the following symptoms   (answer no if symptoms have been present longer than 2 weeks or began   more than 2 weeks ago)? Fever or Chills, Cough, Shortness of breath or   difficulty breathing, Loss of taste or smell, Sore throat, Nasal   congestion, Sneezing or runny nose, Fatigue or generalized body aches   (answer no if pain is specific to a body part e.g. back pain), Diarrhea,   Headache? No  Have you had close contact with someone with COVID-19 in the last 14 days?    No  (Service Expert  click yes below to proceed with [email or web address] As Usual   Scheduling)?  Yes

## 2022-02-24 ENCOUNTER — OFFICE VISIT (OUTPATIENT)
Dept: FAMILY MEDICINE CLINIC | Age: 57
End: 2022-02-24
Payer: COMMERCIAL

## 2022-02-24 VITALS
HEART RATE: 64 BPM | DIASTOLIC BLOOD PRESSURE: 78 MMHG | BODY MASS INDEX: 38.31 KG/M2 | TEMPERATURE: 97.2 F | WEIGHT: 208.2 LBS | RESPIRATION RATE: 16 BRPM | HEIGHT: 62 IN | OXYGEN SATURATION: 98 % | SYSTOLIC BLOOD PRESSURE: 111 MMHG

## 2022-02-24 DIAGNOSIS — T46.4X5A ADVERSE EFFECT OF LISINOPRIL, INITIAL ENCOUNTER: ICD-10-CM

## 2022-02-24 DIAGNOSIS — Z13.220 SCREENING FOR HYPERLIPIDEMIA: ICD-10-CM

## 2022-02-24 DIAGNOSIS — E66.01 SEVERE OBESITY (BMI 35.0-39.9) WITH COMORBIDITY (HCC): ICD-10-CM

## 2022-02-24 DIAGNOSIS — Z13.1 SCREENING FOR DIABETES MELLITUS: ICD-10-CM

## 2022-02-24 DIAGNOSIS — Z76.89 ENCOUNTER TO ESTABLISH CARE: Primary | ICD-10-CM

## 2022-02-24 DIAGNOSIS — E55.9 VITAMIN D DEFICIENCY: ICD-10-CM

## 2022-02-24 DIAGNOSIS — I10 ESSENTIAL HYPERTENSION: ICD-10-CM

## 2022-02-24 DIAGNOSIS — Z13.29 SCREENING FOR THYROID DISORDER: ICD-10-CM

## 2022-02-24 DIAGNOSIS — Z00.00 PREVENTATIVE HEALTH CARE: ICD-10-CM

## 2022-02-24 PROCEDURE — 99214 OFFICE O/P EST MOD 30 MIN: CPT | Performed by: NURSE PRACTITIONER

## 2022-02-24 NOTE — PROGRESS NOTES
OFFICE NOTE    Jeremiah Child is a 61 y.o. female presenting today for office visit. Patient Active Problem List   Diagnosis Code    Essential hypertension I10    Joint stiffness M25.60    Vitamin D deficiency E55.9    Severe obesity (BMI 35.0-39. 9) with comorbidity ([facility] E66.01    Other neutropenia ([facility] D70.8     2/24/2022  8:13 AM    Chief Complaint   Patient presents with    Allergic Reaction     c/o reaction to bp medication      HPI:   Previous  patient. Patient had a reaction of lip swelling with Lisinopril and stopped it. Patient BP is good today. We will not start any new BP medications. Patient will check BP at home. Patient reports appetite is good, no urinary issues, sleeps well and regular bowel movements. Patient denies fever, chills, chest pain, shortness of breath, abdomen pain or dark tarry stool. ROS:    · General: negative for - chills, fever, weight changes or malaise  · HEENT: no sore throat, nasal congestion, vision problems or ear problems  · Respiratory: no cough, shortness of breath, or wheezing  · Cardiovascular: no chest pain, palpitations, or dyspnea on exertion  · Gastrointestinal: no abdominal pain, N/V, change in bowel habits, or black or bloody stools  · Musculoskeletal: no back pain, joint pain, joint stiffness, muscle pain or muscle weakness  · Neurological: no numbness, tingling, headache or dizziness  · Endo:  No polyuria or polydipsia. · : no hematuria, dysuria, frequency, hesitancy, or nocturia.     · Skin: No itching or rash, no open skin, no unusual lesions   · Psychological: negative for - anxiety, depression, sleep disturbances, suicidal or homicidal ideations     PHQ Screening   3 most recent PHQ Screens 2/24/2022   Little interest or pleasure in doing things Not at all   Feeling down, depressed, irritable, or hopeless Not at all   Total Score PHQ 2 0     History  Past Medical History:   Diagnosis Date    Back pain     Chest pain  Hypertension     Sickle cell trait ([facility]        Past Surgical History:   Procedure Laterality Date    HX SALPINGO-OOPHORECTOMY  06/2021    HX TUBAL LIGATION       Social History     Socioeconomic History    Marital status: SINGLE     Spouse name: Not on file    Number of children: Not on file    Years of education: Not on file    Highest education level: Not on file   Occupational History     Comment: [employer]   Tobacco Use    Smoking status: Never Smoker    Smokeless tobacco: Never Used   Vaping Use    Vaping Use: Never used   Substance and Sexual Activity    Alcohol use: Yes     Alcohol/week: 0.0 standard drinks     Comment: one glass of wine occasionally    Drug use: No    Sexual activity: Never   Other Topics Concern    Not on file   Social History Narrative    Not on file     Social Determinants of Health     Financial Resource Strain:     Difficulty of Paying Living Expenses: Not on file   Food Insecurity:     Worried About Running Out of Food in the Last Year: Not on file    Lyndsey of Food in the Last Year: Not on file   Transportation Needs:     Lack of Transportation (Medical): Not on file    Lack of Transportation (Non-Medical):  Not on file   Physical Activity:     Days of Exercise per Week: Not on file    Minutes of Exercise per Session: Not on file   Stress:     Feeling of Stress : Not on file   Social Connections:     Frequency of Communication with Friends and Family: Not on file    Frequency of Social Gatherings with Friends and Family: Not on file    Attends Mandaen Services: Not on file    Active Member of Clubs or Organizations: Not on file    Attends Club or Organization Meetings: Not on file    Marital Status: Not on file   Intimate Partner Violence:     Fear of Current or Ex-Partner: Not on file    Emotionally Abused: Not on file    Physically Abused: Not on file    Sexually Abused: Not on file   Housing Stability:     Unable to Pay for Housing in the Last Year: Not on file    Number of Places Lived in the Last Year: Not on file    Unstable Housing in the Last Year: Not on file     Allergies   Allergen Reactions    Lisinopril Swelling    Shellfish Derived Hives     States she has been able to eat shelfish with no allergic reaction. Patient declined 12/16/21     Current Outpatient Medications   Medication Sig Dispense Refill    ibuprofen (MOTRIN) 800 mg tablet Take 1 Tab by mouth every eight (8) hours as needed for Pain. 100 Tab 0    cholecalciferol (VITAMIN D3) 1,000 unit tablet Take 2 Tabs by mouth daily. 90 Tab 3     Patient Care Team:  Patient Care Team:  Ruthie Mota NP as PCP - General (Nurse Practitioner)  Osman Rodrigues MD (Obstetrics & Gynecology)    Physical Exam  Patient appears well, she is pleasant, alert, oriented x 3, in no distress. ENT normal.  Neck supple. No adenopathy or thyromegaly. MARA. Lungs are clear, good air entry, no wheezes, rhonchi or rales. Cardiovascular, S1 and S2 normal, no murmurs, regular rate and rhythm. No LAD. Chest wall negative for tenderness  Abdomen is soft without tenderness  /Anorectal, deferred. Muscleskeletal, no swelling  Extremities show no edema  Neurological is normal without focal findings. Skin: no concerning lesions. Psych: normal affect. Mood good. Oriented x 3. Judgement and insight intact. Vitals:    02/24/22 0859 02/24/22 0928   BP: 138/78 111/78   Pulse: 60 64   Resp: 16    Temp: 97.2 °F (36.2 °C)    SpO2: 98%    Weight: 208 lb 3.2 oz (94.4 kg)    Height: 5' 2\" (1.575 m)    PainSc:   0 - No pain        Assessment and Plan    Diagnoses and all orders for this visit:    Encounter to establish care    Adverse effect of lisinopril, initial encounter    Essential hypertension  -     METABOLIC PANEL, COMPREHENSIVE; Future    Severe obesity (BMI 35.0-39. 9) with comorbidity [facility])    Preventative health care  -     CBC WITH AUTOMATED DIFF;  Future  -     TSH 3RD GENERATION; Future  -     URINALYSIS W/ RFLX MICROSCOPIC; Future    Screening for hyperlipidemia  -     LIPID PANEL; Future    Screening for thyroid disorder    Screening for diabetes mellitus  -     HEMOGLOBIN A1C WITH EAG; Future    Vitamin D deficiency       *Plan of care reviewed with patient. Patient in agreement with plan and expresses understanding. All questions answered and patient encouraged to call or RTO if further questions or concerns. 50% of 30 minutes spent on counseling and managing her care.       JACOB CashC

## 2022-02-24 NOTE — PROGRESS NOTES
Latasha Calderon is a 61 y.o. female (: 1965) presenting to address:    Chief Complaint   Patient presents with    Allergic Reaction     c/o reaction to bp medication        There were no vitals filed for this visit. Hearing/Vision:   No exam data present    Learning Assessment:     Learning Assessment 3/16/2017   PRIMARY LEARNER Patient   HIGHEST LEVEL OF EDUCATION - PRIMARY LEARNER  [employer]   BARRIERS PRIMARY LEARNER NONE   CO-LEARNER CAREGIVER No   PRIMARY LANGUAGE ENGLISH   LEARNER PREFERENCE PRIMARY LISTENING     DEMONSTRATION     -     -     -   ANSWERED BY patient   RELATIONSHIP SELF     Depression Screening:     3 most recent PHQ Screens 2022   Little interest or pleasure in doing things Not at all   Feeling down, depressed, irritable, or hopeless Not at all   Total Score PHQ 2 0     Fall Risk Assessment:     Fall Risk Assessment, last 12 mths 2020   Able to walk? Yes   Fall in past 12 months? No     Abuse Screening:     Abuse Screening Questionnaire 2020   Do you ever feel afraid of your partner? N   Are you in a relationship with someone who physically or mentally threatens you? N   Is it safe for you to go home? Y     ADL Assessment:   No flowsheet data found. Coordination of Care Questionaire:   1. \"Have you been to the ER, urgent care clinic since your last visit? Hospitalized since your last visit? \" No    2. \"Have you seen or consulted any other health care providers outside of the [address] [place] [address] since your last visit? \" No     3. For patients aged 39-70: Has the patient had a colonoscopy? Yes - no Care Gap present     If the patient is female:    4. For patients aged 41-77: Has the patient had a mammogram within the past 2 years? Yes - no Care Gap present    5. For patients aged 21-65: Has the patient had a pap smear? Yes - no Care Gap present    Advanced Directive:   1. Do you have an Advanced Directive? NO    2.  Would you like information on Advanced Directives?  NO

## 2022-02-25 LAB
A-G RATIO,AGRAT: 1.6 RATIO (ref 1.1–2.6)
ABSOLUTE LYMPHOCYTE COUNT, 10803: 1.9 K/UL (ref 1–4.8)
ALBUMIN SERPL-MCNC: 4.2 G/DL (ref 3.5–5)
ALP SERPL-CCNC: 91 U/L (ref 25–115)
ALT SERPL-CCNC: 26 U/L (ref 5–40)
ANION GAP SERPL CALC-SCNC: 10 MMOL/L (ref 3–15)
AST SERPL W P-5'-P-CCNC: 29 U/L (ref 10–37)
AVG GLU, 10930: 105 MG/DL (ref 91–123)
BASOPHILS # BLD: 0 K/UL (ref 0–0.2)
BASOPHILS NFR BLD: 1 % (ref 0–2)
BILIRUB SERPL-MCNC: 0.9 MG/DL (ref 0.2–1.2)
BILIRUB UR QL: NEGATIVE
BUN SERPL-MCNC: 13 MG/DL (ref 6–22)
CALCIUM SERPL-MCNC: 8.7 MG/DL (ref 8.4–10.5)
CHLORIDE SERPL-SCNC: 104 MMOL/L (ref 98–110)
CHOLEST SERPL-MCNC: 203 MG/DL (ref 110–200)
CLARITY: CLEAR
CO2 SERPL-SCNC: 27 MMOL/L (ref 20–32)
COLOR UR: YELLOW
CREAT SERPL-MCNC: 0.5 MG/DL (ref 0.5–1.2)
EOSINOPHIL # BLD: 0.1 K/UL (ref 0–0.5)
EOSINOPHIL NFR BLD: 2 % (ref 0–6)
ERYTHROCYTE [DISTWIDTH] IN BLOOD BY AUTOMATED COUNT: 13.7 % (ref 10–15.5)
GFRAA, 66117: >60
GFRNA, 66118: >60
GLOBULIN,GLOB: 2.7 G/DL (ref 2–4)
GLUCOSE SERPL-MCNC: 86 MG/DL (ref 70–99)
GLUCOSE UR QL: NEGATIVE MG/DL
GRANULOCYTES,GRANS: 34 % (ref 40–75)
HBA1C MFR BLD HPLC: 5.3 % (ref 4.8–5.6)
HCT VFR BLD AUTO: 38.6 % (ref 35.1–48)
HDLC SERPL-MCNC: 3.6 MG/DL (ref 0–5)
HDLC SERPL-MCNC: 56 MG/DL
HGB BLD-MCNC: 11.6 G/DL (ref 11.7–16)
HGB UR QL STRIP: NEGATIVE
KETONES UR QL STRIP.AUTO: NEGATIVE MG/DL
LDL/HDL RATIO,LDHD: 2.3
LDLC SERPL CALC-MCNC: 130 MG/DL (ref 50–99)
LEUKOCYTE ESTERASE: NEGATIVE
LYMPHOCYTES, LYMLT: 55 % (ref 20–45)
MCH RBC QN AUTO: 29 PG (ref 26–34)
MCHC RBC AUTO-ENTMCNC: 30 G/DL (ref 31–36)
MCV RBC AUTO: 96 FL (ref 80–99)
MONOCYTES # BLD: 0.3 K/UL (ref 0.1–1)
MONOCYTES NFR BLD: 8 % (ref 3–12)
NEUTROPHILS # BLD AUTO: 1.2 K/UL (ref 1.8–7.7)
NITRITE UR QL STRIP.AUTO: NEGATIVE
NON-HDL CHOLESTEROL, 011976: 147 MG/DL
PH UR STRIP: 6 PH (ref 5–8)
PLATELET # BLD AUTO: 397 K/UL (ref 140–440)
PMV BLD AUTO: 9.6 FL (ref 9–13)
POTASSIUM SERPL-SCNC: 4.5 MMOL/L (ref 3.5–5.5)
PROT SERPL-MCNC: 6.9 G/DL (ref 6.4–8.3)
PROT UR QL STRIP: NEGATIVE MG/DL
RBC # BLD AUTO: 4.02 M/UL (ref 3.8–5.2)
SODIUM SERPL-SCNC: 141 MMOL/L (ref 133–145)
SP GR UR: 1.02 (ref 1–1.03)
TRIGL SERPL-MCNC: 85 MG/DL (ref 40–149)
TSH SERPL DL<=0.005 MIU/L-ACNC: 1.29 MCU/ML (ref 0.27–4.2)
URINE ASCORBIC ACID: NEGATIVE MG/DL
UROBILINOGEN UR STRIP-MCNC: <2 MG/DL
VLDLC SERPL CALC-MCNC: 17 MG/DL (ref 8–30)
WBC # BLD AUTO: 3.4 K/UL (ref 4–11)

## 2022-03-16 NOTE — PROGRESS NOTES
TSH normal.  Metabolic panel within normal limits. Total cholesterol high at 203 and LDL high at 130. Hemoglobin A1c 5.3% normal.  Urinalysis was normal.  CBC, your white blood cell count months low and you appear to have a history of this. You are slightly anemic.